# Patient Record
Sex: MALE | Race: WHITE
[De-identification: names, ages, dates, MRNs, and addresses within clinical notes are randomized per-mention and may not be internally consistent; named-entity substitution may affect disease eponyms.]

---

## 2021-03-01 ENCOUNTER — HOSPITAL ENCOUNTER (OUTPATIENT)
Dept: HOSPITAL 75 - RAD FS | Age: 76
End: 2021-03-01
Attending: FAMILY MEDICINE
Payer: MEDICARE

## 2021-03-01 DIAGNOSIS — I72.3: ICD-10-CM

## 2021-03-01 DIAGNOSIS — K80.20: ICD-10-CM

## 2021-03-01 DIAGNOSIS — I71.4: Primary | ICD-10-CM

## 2021-03-01 DIAGNOSIS — M89.8X8: ICD-10-CM

## 2021-03-01 PROCEDURE — 74177 CT ABD & PELVIS W/CONTRAST: CPT

## 2021-03-01 NOTE — DIAGNOSTIC IMAGING REPORT
PROCEDURE: CT abdomen and pelvis with contrast.



TECHNIQUE: Multiple contiguous axial images were obtained through

the abdomen and pelvis after administration of intravenous

contrast. Auto Exposure Controls were utilized during the CT exam

to meet ALARA standards for radiation dose reduction. All CT

scans use one or more of the following dose optimizing

techniques: automated exposure control, MA and/or KvP adjustment

based on patient size and exam type or iterative reconstruction.



INDICATION:  Abdominal aortic aneurysm.



COMPARISON: None available.



FINDINGS: 



LOWER THORAX:  Lung bases are clear. Visualized heart is normal

in size. Incidentally noted are small pericardiophrenic lymph

nodes, which are not enlarged by CT size criteria.



LIVER:  Normal. No focal lesion or acute pathology.



GALLBLADDER:  Hyperdense material layers posteriorly in the

distal body/fundus. No gallbladder wall thickening or

pericholecystic fluid.



BILE DUCTS:  No biliary ductal dilatation.



SPLEEN:  Normal.



PANCREAS:  Normal. No pancreatic ductal dilatation.



ADRENAL GLANDS:  No nodules.



KIDNEYS AND URETERS:  The kidneys are symmetric in size and

demonstrate normal enhancement. No hydronephrosis renal calculus

on either side. Subcentimeter foci of low-attenuation in both

kidneys are too small to characterize, but likely represent

cysts. No suspicious renal mass. No abnormality in the visualized

ureters.

 

STOMACH AND BOWEL:  Stomach and duodenum are normal. Small bowel

and colon are normal in course and caliber, without evidence of

wall thickening or obstruction. No unusual stool burden.



APPENDIX:  Not visualized. No pericecal inflammation.

 

PELVIC ORGANS/BLADDER:  Bladder is normal. Prostate gland is

normal in size.

 

PERITONEUM AND RETROPERITONEUM:  No pneumoperitoneum. No

abdominal free fluid or loculated collection.



LYMPH NODES:  No lymphadenopathy.



VESSELS: There is fusiform dilatation of the infrarenal abdominal

aorta at the level of the aortic bifurcation. This is difficult

to accurately measure given its close proximity to the origin of

the left common iliac artery, however, measures approximately 5.0

cm in greatest AP oblique diameter (image 129 series 5). There is

moderate noncalcified atherosclerotic plaque noted at the level

of the aortic bifurcation, with scattered calcified

atherosclerotic plaque also noted in the abdominal aorta and

iliac arteries. There is also aneurysmal dilatation of the common

iliac arteries. The left common iliac artery measures up to

approximately 1.9 cm in greatest AP diameter and the right common

iliac artery measures up to approximately 3.5 cm in greatest AP

diameter. The renal and mesenteric arteries appear patent on this

study performed without angiographic protocol. Incidental note is

made of a replaced right hepatic artery, which arises directly

from the aorta. There is no evidence of venous thrombosis.



ABDOMINAL WALL: There is a fat-containing paraumbilical hernia,

with the ventral wall defect just below the level of the

umbilicus measuring 2.4 cm in transverse diameter. There is no

inflammatory change in the herniated fat to suggest

strangulation.



BONES: Mild degenerative changes involve the spine. No acute

osseous abnormalities identified. There is a heterogeneous

sclerotic lesion in the right anterior iliac bone near the

acetabular roof (images 72-73 series 3), which measures up to 2

cm in greatest AP diameter. Smaller benign-appearing sclerotic

focus is demonstrated in the posterior right iliac bone, likely a

benign bone island (image 68 series 3).





IMPRESSION: 

No acute abdominal or pelvic pathology.



Infrarenal abdominal aortic aneurysm at the level of the aortic

bifurcation, which measures up to approximately 5 cm in oblique

AP diameter. 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

iliac arteries. There is no evidence of rupture, retroperitoneal

hemorrhage, or other acute abnormality related to these findings.



Cholelithiasis, without evidence of acute cholecystitis.



Incidental note is made of a heterogeneous sclerotic lesion in

the right iliac bone. This is indeterminate and may represent a

bone infarct. Comparison to more remote priors would be

beneficial to assess for stability of this finding. If the

patient has history of malignancy and there is concern for

metastatic disease, nuclear medicine bone scan may be of benefit

in further evaluation.



Other chronic and incidental findings are detailed above.







Dictated by: 



  Dictated on workstation # [id number]

## 2021-03-26 ENCOUNTER — HOSPITAL ENCOUNTER (OUTPATIENT)
Dept: HOSPITAL 75 - CARD | Age: 76
End: 2021-03-26
Attending: INTERNAL MEDICINE
Payer: MEDICARE

## 2021-03-26 DIAGNOSIS — I34.0: ICD-10-CM

## 2021-03-26 DIAGNOSIS — I11.9: Primary | ICD-10-CM

## 2021-03-26 PROCEDURE — 93306 TTE W/DOPPLER COMPLETE: CPT

## 2021-05-10 ENCOUNTER — HOSPITAL ENCOUNTER (OUTPATIENT)
Dept: HOSPITAL 75 - LABNPT | Age: 76
End: 2021-05-10
Attending: INTERNAL MEDICINE
Payer: MEDICARE

## 2021-05-10 DIAGNOSIS — Z01.818: Primary | ICD-10-CM

## 2021-05-10 DIAGNOSIS — Z20.822: ICD-10-CM

## 2021-05-10 PROCEDURE — 87635 SARS-COV-2 COVID-19 AMP PRB: CPT

## 2021-05-12 ENCOUNTER — HOSPITAL ENCOUNTER (OUTPATIENT)
Dept: HOSPITAL 75 - ICU | Age: 76
LOS: 1 days | Discharge: HOME | End: 2021-05-13
Attending: INTERNAL MEDICINE
Payer: MEDICARE

## 2021-05-12 VITALS — DIASTOLIC BLOOD PRESSURE: 87 MMHG | SYSTOLIC BLOOD PRESSURE: 143 MMHG

## 2021-05-12 VITALS — SYSTOLIC BLOOD PRESSURE: 130 MMHG | DIASTOLIC BLOOD PRESSURE: 74 MMHG

## 2021-05-12 VITALS — DIASTOLIC BLOOD PRESSURE: 107 MMHG | SYSTOLIC BLOOD PRESSURE: 133 MMHG

## 2021-05-12 VITALS — SYSTOLIC BLOOD PRESSURE: 114 MMHG | DIASTOLIC BLOOD PRESSURE: 81 MMHG

## 2021-05-12 VITALS — SYSTOLIC BLOOD PRESSURE: 128 MMHG | DIASTOLIC BLOOD PRESSURE: 86 MMHG

## 2021-05-12 VITALS — SYSTOLIC BLOOD PRESSURE: 126 MMHG | DIASTOLIC BLOOD PRESSURE: 78 MMHG

## 2021-05-12 VITALS — DIASTOLIC BLOOD PRESSURE: 59 MMHG | SYSTOLIC BLOOD PRESSURE: 111 MMHG

## 2021-05-12 VITALS — DIASTOLIC BLOOD PRESSURE: 83 MMHG | SYSTOLIC BLOOD PRESSURE: 121 MMHG

## 2021-05-12 VITALS — SYSTOLIC BLOOD PRESSURE: 115 MMHG | DIASTOLIC BLOOD PRESSURE: 72 MMHG

## 2021-05-12 VITALS — DIASTOLIC BLOOD PRESSURE: 72 MMHG | SYSTOLIC BLOOD PRESSURE: 120 MMHG

## 2021-05-12 VITALS — SYSTOLIC BLOOD PRESSURE: 122 MMHG | DIASTOLIC BLOOD PRESSURE: 94 MMHG

## 2021-05-12 VITALS — SYSTOLIC BLOOD PRESSURE: 128 MMHG | DIASTOLIC BLOOD PRESSURE: 70 MMHG

## 2021-05-12 VITALS — BODY MASS INDEX: 38.59 KG/M2 | HEIGHT: 74.02 IN | WEIGHT: 300.71 LBS

## 2021-05-12 VITALS — DIASTOLIC BLOOD PRESSURE: 78 MMHG | SYSTOLIC BLOOD PRESSURE: 122 MMHG

## 2021-05-12 VITALS — DIASTOLIC BLOOD PRESSURE: 82 MMHG | SYSTOLIC BLOOD PRESSURE: 127 MMHG

## 2021-05-12 VITALS — DIASTOLIC BLOOD PRESSURE: 96 MMHG | SYSTOLIC BLOOD PRESSURE: 102 MMHG

## 2021-05-12 VITALS — SYSTOLIC BLOOD PRESSURE: 120 MMHG | DIASTOLIC BLOOD PRESSURE: 84 MMHG

## 2021-05-12 DIAGNOSIS — Z86.73: ICD-10-CM

## 2021-05-12 DIAGNOSIS — G47.33: ICD-10-CM

## 2021-05-12 DIAGNOSIS — I71.4: ICD-10-CM

## 2021-05-12 DIAGNOSIS — Z87.891: ICD-10-CM

## 2021-05-12 DIAGNOSIS — E66.9: ICD-10-CM

## 2021-05-12 DIAGNOSIS — Z79.891: ICD-10-CM

## 2021-05-12 DIAGNOSIS — E78.2: ICD-10-CM

## 2021-05-12 DIAGNOSIS — I34.0: ICD-10-CM

## 2021-05-12 DIAGNOSIS — Z98.890: ICD-10-CM

## 2021-05-12 DIAGNOSIS — E11.9: ICD-10-CM

## 2021-05-12 DIAGNOSIS — I48.20: Primary | ICD-10-CM

## 2021-05-12 DIAGNOSIS — Z79.02: ICD-10-CM

## 2021-05-12 DIAGNOSIS — Z88.8: ICD-10-CM

## 2021-05-12 DIAGNOSIS — I11.9: ICD-10-CM

## 2021-05-12 DIAGNOSIS — I25.10: ICD-10-CM

## 2021-05-12 DIAGNOSIS — Z79.4: ICD-10-CM

## 2021-05-12 DIAGNOSIS — E78.1: ICD-10-CM

## 2021-05-12 DIAGNOSIS — I49.3: ICD-10-CM

## 2021-05-12 DIAGNOSIS — Z79.899: ICD-10-CM

## 2021-05-12 DIAGNOSIS — Z79.01: ICD-10-CM

## 2021-05-12 LAB
ALBUMIN SERPL-MCNC: 3.7 GM/DL (ref 3.2–4.5)
ALP SERPL-CCNC: 59 U/L (ref 40–136)
ALT SERPL-CCNC: 19 U/L (ref 0–55)
APTT BLD: 31 SEC (ref 24–35)
BILIRUB SERPL-MCNC: 0.4 MG/DL (ref 0.1–1)
BUN/CREAT SERPL: 20
CALCIUM SERPL-MCNC: 9 MG/DL (ref 8.5–10.1)
CHLORIDE SERPL-SCNC: 105 MMOL/L (ref 98–107)
CHOLEST SERPL-MCNC: 124 MG/DL (ref ?–200)
CO2 SERPL-SCNC: 30 MMOL/L (ref 21–32)
CREAT SERPL-MCNC: 1.14 MG/DL (ref 0.6–1.3)
GFR SERPLBLD BASED ON 1.73 SQ M-ARVRAT: > 60 ML/MIN
GLUCOSE SERPL-MCNC: 195 MG/DL (ref 70–105)
HCT VFR BLD CALC: 43 % (ref 40–54)
HDLC SERPL-MCNC: 26 MG/DL (ref 40–60)
HGB BLD-MCNC: 14 G/DL (ref 13.3–17.7)
INR PPP: 1.1 (ref 0.8–1.4)
MCH RBC QN AUTO: 29 PG (ref 25–34)
MCHC RBC AUTO-ENTMCNC: 33 G/DL (ref 32–36)
MCV RBC AUTO: 88 FL (ref 80–99)
PLATELET # BLD: 209 10^3/UL (ref 130–400)
PMV BLD AUTO: 11.3 FL (ref 9–12.2)
POTASSIUM SERPL-SCNC: 3.8 MMOL/L (ref 3.6–5)
PROT SERPL-MCNC: 6.7 GM/DL (ref 6.4–8.2)
PROTHROMBIN TIME: 14.2 SEC (ref 12.2–14.7)
SODIUM SERPL-SCNC: 142 MMOL/L (ref 135–145)
TRIGL SERPL-MCNC: 137 MG/DL (ref ?–150)
VLDLC SERPL CALC-MCNC: 27 MG/DL (ref 5–40)
WBC # BLD AUTO: 7.3 10^3/UL (ref 4.3–11)

## 2021-05-12 PROCEDURE — 80053 COMPREHEN METABOLIC PANEL: CPT

## 2021-05-12 PROCEDURE — 80061 LIPID PANEL: CPT

## 2021-05-12 PROCEDURE — 93312 ECHO TRANSESOPHAGEAL: CPT

## 2021-05-12 PROCEDURE — 85610 PROTHROMBIN TIME: CPT

## 2021-05-12 PROCEDURE — 92960 CARDIOVERSION ELECTRIC EXT: CPT

## 2021-05-12 PROCEDURE — 36415 COLL VENOUS BLD VENIPUNCTURE: CPT

## 2021-05-12 PROCEDURE — 85027 COMPLETE CBC AUTOMATED: CPT

## 2021-05-12 PROCEDURE — 71045 X-RAY EXAM CHEST 1 VIEW: CPT

## 2021-05-12 PROCEDURE — 93005 ELECTROCARDIOGRAM TRACING: CPT

## 2021-05-12 PROCEDURE — 85730 THROMBOPLASTIN TIME PARTIAL: CPT

## 2021-05-12 PROCEDURE — 87081 CULTURE SCREEN ONLY: CPT

## 2021-05-12 RX ADMIN — FUROSEMIDE SCH MG: 40 TABLET ORAL at 18:56

## 2021-05-12 NOTE — CARDIOVERSION
Cardioversion


PROCEDURE PHYSICIAN:   Hailey Stearns 


 


DATE OF PROCEDURE:  5/12/21 


 


DIRECT EXTERNAL ELECTRICAL CARDIOVERSION:  


 


Indications:


Atrial Fibrillation with rapid ventricular rate





Preoperative diagnoses: 


Atrial Fibrillation with rapid ventricular rate


 


Postoperative diagnosis: 


Atrial fibrillation





Anesthesia:


By Anesthesia services





Complications: 


None





Specimen: 


None





Contrast: 0





Flouroscopy: none





Procedure Details:


 


The patient was brought the cath lab after informed consent was taken, all the 

risks and complications were explained including the risk of stroke. Electrical 

cardioversion was carried out with anesthesia support with propofol. 200 joules 

of synchronized shock was  delivered twice without success in terminating atrial

fibrillation.  Decision was made to load the patient with amiodarone and attempt

cardioversion again tomorrow





Conclusions:


Failed to attempt for cardioversion with 200 J














HAILEY STEARNS MD             May 12, 2021 12:17 pm

## 2021-05-12 NOTE — DIAGNOSTIC IMAGING REPORT
INDICATION: Atrial fibrillation and pre-heart catheterization.



Time of exam: 9:47 AM



No prior studies are available for comparison.



The heart size is normal. The pulmonary vascularity is

unremarkable. The lungs are clear. No infiltrate, effusion or

pneumothorax is detected.



IMPRESSION: No acute cardiopulmonary process is detected.



Dictated by: 



  Dictated on workstation # [id number]

## 2021-05-12 NOTE — CONSCIOUS SEDATION/ASA
Conscious Sedation Pre-Proced


Time


11:00





ASA Score


3


For ASA 3 and 4: Consider anesthesia and medical clearance. Also, for patients

with a history of failed moderate sedation consider anesthesia.

















Airway 


 


Lungs 


 


Heart 


 


 ASA score


 


 ASA 1: a normal healthy patient


 


 ASA 2:  a patient with a mild systemic disease (mid diabetes, controlled 

hypertension, obesity 


 


x ASA 3:  a patient with a severe systemic disease that limits activity  

(angina, COPD, prior Myocardial infarction)


 


 ASA 4:  a patient with an incapacitating disease that is a constant threat to 

life (CHF, renal failure)


 


 ASA 5:  a moribund patient not expected to survive 24 hrs.  (ruptured aneurysm)


 


 ASA 6:  a declared brain-dead patient whose organs are being harvested.


 


 For emergent operations, add the letter E after the classification











Mallampati Classification


Grade 3





Sedation Plan


Analgesia, Amnesia, Plan communicated to team members, Discussed options with 

patient/fam, Discussed risks with patient/fam


The patient is an appropriate candidate to undergo the planned procedure, 

sedation, and anesthesia.





The patient immediately re-assessed prior to indication.











HAILEY ZEE MD             May 12, 2021 12:16 pm

## 2021-05-12 NOTE — ANESTHESIA-GENERAL POST-OP
MAC


Patient Condition


Mental Status/LOC:  Same as Preop


Cardiovascular:  Satisfactory


Nausea/Vomiting:  Absent


Respiratory:  Satisfactory


Pain:  Controlled


Complications:  Absent





Post Op Complications


Complications


None





Follow Up Care/Instructions


Patient Instructions


None needed.





Anesthesiology Discharge Order


Discharge Order


Patient is doing well, no complaints, stable vital signs, no apparent adverse 

anesthesia problems.   


No complications reported per nursing.











MICAH EVERETT CRNA              May 12, 2021 12:55

## 2021-05-13 VITALS — DIASTOLIC BLOOD PRESSURE: 69 MMHG | SYSTOLIC BLOOD PRESSURE: 109 MMHG

## 2021-05-13 VITALS — SYSTOLIC BLOOD PRESSURE: 123 MMHG | DIASTOLIC BLOOD PRESSURE: 89 MMHG

## 2021-05-13 VITALS — SYSTOLIC BLOOD PRESSURE: 104 MMHG | DIASTOLIC BLOOD PRESSURE: 69 MMHG

## 2021-05-13 VITALS — SYSTOLIC BLOOD PRESSURE: 98 MMHG | DIASTOLIC BLOOD PRESSURE: 46 MMHG

## 2021-05-13 VITALS — SYSTOLIC BLOOD PRESSURE: 114 MMHG | DIASTOLIC BLOOD PRESSURE: 66 MMHG

## 2021-05-13 VITALS — DIASTOLIC BLOOD PRESSURE: 52 MMHG | SYSTOLIC BLOOD PRESSURE: 109 MMHG

## 2021-05-13 VITALS — SYSTOLIC BLOOD PRESSURE: 108 MMHG | DIASTOLIC BLOOD PRESSURE: 64 MMHG

## 2021-05-13 RX ADMIN — FUROSEMIDE SCH MG: 40 TABLET ORAL at 07:50

## 2021-05-13 NOTE — CARDIOLOGY PROGRESS NOTE
Subjective


Date Seen by Provider:  May 13, 2021


Time Seen by Provider:  07:50


Subjective/Events-last exam


Patient is laying down in bed, feeling better, no further chest pain


Has chest pain, shortness of breath and diaphoresis when he was started on 

amiodarone drip subsequently it was discontinued


Review of Systems


General:  No Chills, No Night Sweats, No Fatigue, No Malaise, No Appetite, No 

Other


HEENT:  No Head Aches, No Visual Changes, No Eye Pain, No Ear Pain, No 

Dysphasia, No Sinus Congestion, No Post Nasal Drip, No Sore Throat, No Other


Pulmonary:  No Dyspnea, No Cough, No Pleuritic Chest Pain, No Other


Cardiovascular:  No: Chest Pain, Palpitations, Orthopnea, Paroxysmal Noc. 

Dyspnea, Edema, Lt Headedness, Other





Objective-Cardiology


Exam


Last Set of Vital Signs





Vital Signs








 5/12/21 5/12/21 5/13/21 5/13/21





 09:54 11:59 06:00 06:36


 


Temp 36.4   


 


Pulse   77 


 


Resp   24 


 


B/P (MAP)   123/89 (100) 


 


Pulse Ox    95


 


O2 Delivery    Room Air


 


O2 Flow Rate  8.00  





Capillary Refill :


General:  Alert, Oriented X3, Cooperative


HEENT:  Atraumatic, PERRLA


Neck:  Supple, No JVD, No Thyromegaly


Lungs:  Clear to Auscultation, Normal Air Movement


Heart:  Normal S1, Normal S2, No Murmurs, Other (Atrial fibrillation)


Abdomen:  Normal Bowel Sounds, Soft, No Tenderness, No Hepatosplenomegaly, No 

Masses


Extremities:  No Clubbing, No Cyanosis, No Edema, Normal Pulses, No 

Tenderness/Swelling


Skin:  No Rashes, No Breakdown, No Significant Lesion


Neuro:  Normal Gait, Normal Speech, Strength at 5/5 X4 Ext, Normal Tone, 

Sensation Intact


Psych/Mental Status:  Mental Status NL, Mood NL





Results


Lab


Laboratory Tests


5/12/21 09:45














A/P-Cardiology


Admission Diagnosis


Persistent atrial fibrillation


Palpitation


Hypertension


Hyperlipidemia


Coronary artery disease





Assessment/Plan


Atrial fibrillation with controlled rate, underwent YAQUELIN with attempt of 

cardioversion twice yesterday has failed, he was started on amiodarone drip and 

had a reaction, had chest pain and shortness of breath and diaphoresis 

subsequently it was discontinued, this morning he is feeling well.  No other 

symptoms.  I was planning to do cardioversion this morning but elected to cancel

the procedure and refer him for EP evaluation as an outpatient





Palpitation, better at this time





Coronary artery disease, clinically stable, monitor as an outpatient





Hypertension, monitor blood pressure restart home medication





Hyperlipidemia.











HAILEY ZEE MD              May 13, 2021 07:52

## 2021-05-13 NOTE — DISCHARGE INST-POST CATH
Discharge Inst-CATH/EP


Problems Reviewed?:  Yes


Post Cardiac Cath/EP D/C Inst


Follow Up/Plan


Appointment with Dr. Stearns's office in 2 weeks


<b>CARDIAC CATH/EP PROCEDURE DISCHARGE INSTRUCTIONS</b>





ACTIVITY





* Go Home directly and rest.


* Limit activity of the leg (or wrist if it was used) for 7 days including 

aerobics, swimming,


   jogging, bicycling, etc.


* Restrict stair-climbing for 7 days if possible, if not, climb up with your 

non-cath leg, then


   bring together on the same step.


* Avoid lifting, pushing, pulling or excessive movement of the affected 

extremity for 7 days.


* Customary sexual activity may be resumed after 2 days-use caution not to use a

position  


   that strains or causes pain to the affected extremity.


* No driving for 24 hours.


* NO SMOKING. 


* Avoid straining for bowel movements for 7 days.


* Gentle walking on level ground is allowed.


* Returning to work will depend on the type of procedure and the results. Your 

doctor will discuss


   this with you.





CALL YOUR DOCTOR FOR ANY OF THE FOLLOWING:





*If bleeding from the puncture site occurs- Apply gentle pressure to site with 

clean cloth and call


   your doctor or EMS.


* If a knot or lump forms under the skin, increases in size, or causes pain.


* If bruising appears to be worsening or moving further down your leg instead of

disappearing.


* Temperature above 101 F.





CARE OF YOUR GROIN INCISION;





* Bruising or purple discoloration of the skin near the puncture site is common.


* You may shower only, no bathtub bathing for 5 days.  Be careful to avoid 

slipping as your


   leg may feel stiff.


* If a closure device was used on your femoral artery, please see the attached 

guide regarding


   care of the device and your leg.


* Leave dressing on FOR 24 hours.





CARE OF YOUR WRIST INCISION;





* Bruising or purple discoloration of the skin near the puncture site is common.


* You may shower.


* DO NOT submerge wrist.


* Leave dressing on FOR 24 hours.











HAILEY STEARNS MD              May 13, 2021 07:50

## 2021-09-30 ENCOUNTER — HOSPITAL ENCOUNTER (INPATIENT)
Dept: HOSPITAL 75 - ER FS | Age: 76
LOS: 5 days | Discharge: HOME | DRG: 640 | End: 2021-10-05
Attending: FAMILY MEDICINE | Admitting: FAMILY MEDICINE
Payer: MEDICARE

## 2021-09-30 VITALS — DIASTOLIC BLOOD PRESSURE: 77 MMHG | SYSTOLIC BLOOD PRESSURE: 155 MMHG

## 2021-09-30 VITALS — SYSTOLIC BLOOD PRESSURE: 123 MMHG | DIASTOLIC BLOOD PRESSURE: 56 MMHG

## 2021-09-30 VITALS — HEIGHT: 74.02 IN | BODY MASS INDEX: 39.13 KG/M2 | WEIGHT: 304.9 LBS

## 2021-09-30 DIAGNOSIS — Z86.73: ICD-10-CM

## 2021-09-30 DIAGNOSIS — I48.0: ICD-10-CM

## 2021-09-30 DIAGNOSIS — Z88.8: ICD-10-CM

## 2021-09-30 DIAGNOSIS — I27.81: ICD-10-CM

## 2021-09-30 DIAGNOSIS — E61.1: ICD-10-CM

## 2021-09-30 DIAGNOSIS — E78.00: ICD-10-CM

## 2021-09-30 DIAGNOSIS — Z99.81: ICD-10-CM

## 2021-09-30 DIAGNOSIS — J43.9: ICD-10-CM

## 2021-09-30 DIAGNOSIS — E87.70: Primary | ICD-10-CM

## 2021-09-30 DIAGNOSIS — T82.310A: ICD-10-CM

## 2021-09-30 DIAGNOSIS — E66.2: ICD-10-CM

## 2021-09-30 DIAGNOSIS — T50.2X5A: ICD-10-CM

## 2021-09-30 DIAGNOSIS — R14.0: ICD-10-CM

## 2021-09-30 DIAGNOSIS — I25.10: ICD-10-CM

## 2021-09-30 DIAGNOSIS — Z95.5: ICD-10-CM

## 2021-09-30 DIAGNOSIS — Z85.828: ICD-10-CM

## 2021-09-30 DIAGNOSIS — K59.00: ICD-10-CM

## 2021-09-30 DIAGNOSIS — I34.0: ICD-10-CM

## 2021-09-30 DIAGNOSIS — E78.5: ICD-10-CM

## 2021-09-30 DIAGNOSIS — E78.1: ICD-10-CM

## 2021-09-30 DIAGNOSIS — E53.8: ICD-10-CM

## 2021-09-30 DIAGNOSIS — H91.90: ICD-10-CM

## 2021-09-30 DIAGNOSIS — H54.7: ICD-10-CM

## 2021-09-30 DIAGNOSIS — Z23: ICD-10-CM

## 2021-09-30 DIAGNOSIS — I11.0: ICD-10-CM

## 2021-09-30 DIAGNOSIS — Z79.01: ICD-10-CM

## 2021-09-30 DIAGNOSIS — K21.9: ICD-10-CM

## 2021-09-30 DIAGNOSIS — I50.9: ICD-10-CM

## 2021-09-30 DIAGNOSIS — Z79.899: ICD-10-CM

## 2021-09-30 DIAGNOSIS — Z79.02: ICD-10-CM

## 2021-09-30 DIAGNOSIS — J96.90: ICD-10-CM

## 2021-09-30 DIAGNOSIS — E11.9: ICD-10-CM

## 2021-09-30 DIAGNOSIS — Z79.4: ICD-10-CM

## 2021-09-30 DIAGNOSIS — E87.6: ICD-10-CM

## 2021-09-30 DIAGNOSIS — J20.9: ICD-10-CM

## 2021-09-30 DIAGNOSIS — Z87.891: ICD-10-CM

## 2021-09-30 DIAGNOSIS — Z79.82: ICD-10-CM

## 2021-09-30 LAB
ALBUMIN SERPL-MCNC: 4.2 GM/DL (ref 3.2–4.5)
ALP SERPL-CCNC: 82 U/L (ref 40–136)
ALT SERPL-CCNC: 20 U/L (ref 0–55)
APTT BLD: 28 SEC (ref 24–35)
BASOPHILS # BLD AUTO: 0.1 10^3/UL (ref 0–0.1)
BASOPHILS NFR BLD AUTO: 1 % (ref 0–10)
BILIRUB SERPL-MCNC: 0.3 MG/DL (ref 0.1–1)
BUN/CREAT SERPL: 15
CALCIUM SERPL-MCNC: 9.2 MG/DL (ref 8.5–10.1)
CHLORIDE SERPL-SCNC: 99 MMOL/L (ref 98–107)
CO2 SERPL-SCNC: 25 MMOL/L (ref 21–32)
CREAT SERPL-MCNC: 1.2 MG/DL (ref 0.6–1.3)
EOSINOPHIL # BLD AUTO: 0.3 10^3/UL (ref 0–0.3)
EOSINOPHIL NFR BLD AUTO: 3 % (ref 0–10)
GFR SERPLBLD BASED ON 1.73 SQ M-ARVRAT: 59 ML/MIN
GLUCOSE SERPL-MCNC: 364 MG/DL (ref 70–105)
HCT VFR BLD CALC: 36 % (ref 40–54)
HGB BLD-MCNC: 11.1 G/DL (ref 13.3–17.7)
INR PPP: 1 (ref 0.8–1.4)
LIPASE SERPL-CCNC: 35 U/L (ref 8–78)
LYMPHOCYTES # BLD AUTO: 2.1 X 10^3 (ref 1–4)
LYMPHOCYTES NFR BLD AUTO: 26 % (ref 12–44)
MANUAL DIFFERENTIAL PERFORMED BLD QL: NO
MCH RBC QN AUTO: 23 PG (ref 25–34)
MCHC RBC AUTO-ENTMCNC: 31 G/DL (ref 32–36)
MCV RBC AUTO: 76 FL (ref 80–99)
MONOCYTES # BLD AUTO: 0.7 X 10^3 (ref 0–1)
MONOCYTES NFR BLD AUTO: 9 % (ref 0–12)
NEUTROPHILS # BLD AUTO: 4.6 X 10^3 (ref 1.8–7.8)
NEUTROPHILS NFR BLD AUTO: 59 % (ref 42–75)
PLATELET # BLD: 324 10^3/UL (ref 130–400)
PMV BLD AUTO: 11.2 FL (ref 9–12.2)
POTASSIUM SERPL-SCNC: 4 MMOL/L (ref 3.6–5)
PROT SERPL-MCNC: 7 GM/DL (ref 6.4–8.2)
PROTHROMBIN TIME: 13.6 SEC (ref 12.2–14.7)
SODIUM SERPL-SCNC: 139 MMOL/L (ref 135–145)
WBC # BLD AUTO: 7.8 10^3/UL (ref 4.3–11)

## 2021-09-30 PROCEDURE — 87070 CULTURE OTHR SPECIMN AEROBIC: CPT

## 2021-09-30 PROCEDURE — 82607 VITAMIN B-12: CPT

## 2021-09-30 PROCEDURE — 80076 HEPATIC FUNCTION PANEL: CPT

## 2021-09-30 PROCEDURE — 36415 COLL VENOUS BLD VENIPUNCTURE: CPT

## 2021-09-30 PROCEDURE — 83540 ASSAY OF IRON: CPT

## 2021-09-30 PROCEDURE — 80053 COMPREHEN METABOLIC PANEL: CPT

## 2021-09-30 PROCEDURE — 84145 PROCALCITONIN (PCT): CPT

## 2021-09-30 PROCEDURE — 83735 ASSAY OF MAGNESIUM: CPT

## 2021-09-30 PROCEDURE — 84484 ASSAY OF TROPONIN QUANT: CPT

## 2021-09-30 PROCEDURE — 71046 X-RAY EXAM CHEST 2 VIEWS: CPT

## 2021-09-30 PROCEDURE — 93306 TTE W/DOPPLER COMPLETE: CPT

## 2021-09-30 PROCEDURE — 71045 X-RAY EXAM CHEST 1 VIEW: CPT

## 2021-09-30 PROCEDURE — 85610 PROTHROMBIN TIME: CPT

## 2021-09-30 PROCEDURE — 83690 ASSAY OF LIPASE: CPT

## 2021-09-30 PROCEDURE — 82274 ASSAY TEST FOR BLOOD FECAL: CPT

## 2021-09-30 PROCEDURE — 87205 SMEAR GRAM STAIN: CPT

## 2021-09-30 PROCEDURE — 93005 ELECTROCARDIOGRAM TRACING: CPT

## 2021-09-30 PROCEDURE — 85730 THROMBOPLASTIN TIME PARTIAL: CPT

## 2021-09-30 PROCEDURE — 94760 N-INVAS EAR/PLS OXIMETRY 1: CPT

## 2021-09-30 PROCEDURE — 83550 IRON BINDING TEST: CPT

## 2021-09-30 PROCEDURE — 82728 ASSAY OF FERRITIN: CPT

## 2021-09-30 PROCEDURE — 82805 BLOOD GASES W/O2 SATURATION: CPT

## 2021-09-30 PROCEDURE — 94640 AIRWAY INHALATION TREATMENT: CPT

## 2021-09-30 PROCEDURE — 80048 BASIC METABOLIC PNL TOTAL CA: CPT

## 2021-09-30 PROCEDURE — 94761 N-INVAS EAR/PLS OXIMETRY MLT: CPT

## 2021-09-30 PROCEDURE — 85025 COMPLETE CBC W/AUTO DIFF WBC: CPT

## 2021-09-30 PROCEDURE — 83880 ASSAY OF NATRIURETIC PEPTIDE: CPT

## 2021-09-30 PROCEDURE — 74177 CT ABD & PELVIS W/CONTRAST: CPT

## 2021-09-30 PROCEDURE — 82947 ASSAY GLUCOSE BLOOD QUANT: CPT

## 2021-09-30 RX ADMIN — PREGABALIN SCH MG: 100 CAPSULE ORAL at 20:01

## 2021-09-30 RX ADMIN — Medication SCH ML: at 20:01

## 2021-09-30 RX ADMIN — INSULIN ASPART SCH UNIT: 100 INJECTION, SOLUTION INTRAVENOUS; SUBCUTANEOUS at 21:37

## 2021-09-30 RX ADMIN — APIXABAN SCH MG: 5 TABLET, FILM COATED ORAL at 20:01

## 2021-09-30 RX ADMIN — CYCLOBENZAPRINE HYDROCHLORIDE SCH MG: 10 TABLET, FILM COATED ORAL at 20:01

## 2021-09-30 RX ADMIN — FUROSEMIDE SCH MG: 10 INJECTION, SOLUTION INTRAVENOUS at 18:36

## 2021-09-30 NOTE — ED ABDOMINAL PAIN
General


Stated Complaint:  BOWEL CONSTIPATION


Source of Information:  Patient


Exam Limitations:  No Limitations





History of Present Illness


Date Seen by Provider:  Sep 30, 2021


Time Seen by Provider:  12:48


Initial Comments


76yoM with PMH of CAD, pAFIB on anticoagulation, HTN, HLD, DM, COPD, and AAA s/p

repair coming in due to feeling bloated. Had not had a BM in about 4 days so was

taking a large amount of stool softeners. Did had a watery stool yesterday but 

did not feel better after. Took himself off lasix 1 week ago because of cramping

in his legs, and started drinking a lot of pedialyte, carbonated beverages, and 

miralax. Started having increased swelling in his extremities shortly after. 

Took a dose of lasix this morning and has urinated a lot thus far. Now is 

feeling more SOB on top of his normal amount with his lung disease. Normally can

sleep with one pillow and now can't lay flat. Was 379 pounds 4 days ago and is 

407 today.





Allergies and Home Medications


Allergies


Coded Allergies:  


     lisinopril (Verified  Allergy, Mild, 21)


   MUSCLE ACHES


     losartan (Verified  Allergy, Mild, Rash, 21)


     lovastatin (Verified  Allergy, Mild, 21)


   MUSCLE ACHES


     rosiglitazone (Verified  Allergy, Mild, Hives, 21)


     umeclidinium (Verified  Allergy, Mild, Rash, 21)





Patient Home Medication List


Home Medication List Reviewed:  Yes


Albuterol Sulfate (Proair Hfa) 1 Puff Puff, 2 PUFF IH Q4H PRN for SHORTNESS OF 

BREATH, (Reported)


   Entered as Reported by: TEDDY ROMAN on 21 1043


Apixaban (Eliquis) 5 Mg Tablet, 5 MG PO BID, (Reported)


   Entered as Reported by: TEDDY ROMAN on 21 1043


Clopidogrel Bisulfate (Clopidogrel) 75 Mg Tablet, 75 MG PO DAILY, (Reported)


   Entered as Reported by: TEDDY ROMAN on 21 1043


Diltiazem HCl (Cardizem Cd) 120 Mg Cap.er.24h, 120 MG PO DAILY, (Reported)


   Entered as Reported by: TEDDY ROMAN on 21 1043


Fenofibrate Nanocrystallized (Fenofibrate) 145 Mg Tablet, 145 MG PO DAILY, 

(Reported)


   Entered as Reported by: TEDDY ROMAN on 21


Fluticasone Propionate (Flonase Allergy Relief) 9.9 Ml [place].susp, 2 SPRAY NS 

DAILY PRN for ALLERGIES, (Reported)


   Entered as Reported by: TEDDY ROMAN on 21


Furosemide (Furosemide) 40 Mg Tablet, 40 MG PO BID, (Reported)


   Entered as Reported by: TEDDY ROMAN on 21


Hydrochlorothiazide (Hydrochlorothiazide) 12.5 Mg Tablet, 12.5 MG PO DAILY, 

(Reported)


   Entered as Reported by: TEDDY ROMAN on 21


Insulin Aspart (Novolog Flexpen) 300 Units/3 Ml Solution, 25 UNITS SQ TIDWM, 

(Reported)


   Entered as Reported by: TEDDY ROMAN on 21


Insulin Glargine,Hum.rec.anlog (Basaglar Kwikpen U-100) 100 Unit/1 Ml 

Insuln.pen, 60 UNIT SQ BID, (Reported)


   Entered as Reported by: TEDDY ROMAN on 21


Ipratropium/Albuterol Sulfate (Iprat-Albut 0.5-3(2.5) mg/3 ml) 3 Ml Ampul.neb, 3

ML IH Q6H PRN for SHORTNESS OF BREATH, (Reported)


   Entered as Reported by: TEDDY ROMAN on 21


Meloxicam (Meloxicam) 15 Mg Tablet, 15 MG PO DAILY, (Reported)


   Entered as Reported by: TEDDY ROMAN on 21


Metoprolol Succinate (Metoprolol Succinate) 50 Mg Tab.er.24h, 50 MG PO DAILY, 

(Reported)


   Entered as Reported by: TEDDY ROMAN on 21


Montelukast Sodium (Montelukast Sodium) 10 Mg Tablet, 10 MG PO HS, (Reported)


   Entered as Reported by: TEDDY ROMAN on 21


Omeprazole (Omeprazole) 20 Mg Capsule.dr, 20 MG PO DAILY, (Reported)


   Entered as Reported by: TEDDY ROMAN on 5/12/21 1043


Pregabalin (Pregabalin) 100 Mg Capsule, 100 MG PO TID, (Reported)


   Entered as Reported by: TEDDY ROMAN on 21 1043


Rosuvastatin Calcium (Rosuvastatin Calcium) 40 Mg Tablet, 40 MG PO DAILY, 

(Reported)


   Entered as Reported by: TEDDY ROMAN on 21 1043





Review of Systems


Review of Systems


Constitutional:  No chills, No fever


EENTM:  No Blurred Vision


Respiratory:  Denies Cough; Shortness of Air


Cardiovascular:  Denies Chest Pain, Denies Palpitations


Gastrointestinal:  Constipated; Denies Diarrhea, Denies Nausea, Denies Vomiting


Genitourinary:  No Symptoms Reported


Musculoskeletal:  No back pain


Skin:  No rash


Psychiatric/Neurological:  No Symptoms Reported


Endocrine:  No Symptoms Reported


Hematologic/Lymphatic:  No Symptoms Reported





All Other Systems Reviewed


Negative Unless Noted:  Yes





Past Medical-Social-Family Hx


Past Medical History


Appendectomy


Respiratory:  Yes


Emphysema


Cardiac:  Yes


Atrial Fibrillation, Coronary Artery Disease, High Cholesterol, Hypertension


Neurological:  Yes


TIA


Genitourinary:  No


Gastrointestinal:  Yes


Gastroesophageal Reflux


Cancer:  Yes


Skin


Did You Recieve Any Treatments:  Yes


What Type of Treatment Did You:  Surgical Intervention


Blood Disorders:  No





Physical Exam


Vital Signs





Vital Signs - First Documented








 21





 12:48


 


Temp 36.0


 


Pulse 82


 


Resp 26


 


B/P (MAP) 157/80 (105)


 


Pulse Ox 96


 


O2 Delivery Room Air





Capillary Refill :


Height/Weight/BMI


Height: '"


Weight: lbs. oz. kg; 38.59 BMI


Method:


General Appearance:  WD/WN, no apparent distress


HEENT:  PERRL/EOMI, normal ENT inspection, pharynx normal


Neck:  non-tender, full range of motion, supple, normal inspection


Respiratory:  chest non-tender, lungs clear, normal breath sounds, no 

respiratory distress, no accessory muscle use


Cardiovascular:  regular rate, rhythm, no murmur, other (3+ edema)


Gastrointestinal:  normal bowel sounds, soft; No distended, No guarding, No 

rebound; tenderness


Extremities:  normal range of motion, non-tender, normal inspection, no pedal 

edema, no calf tenderness, normal capillary refill


Back:  normal inspection, no CVA tenderness


Neurologic/Psychiatric:  no motor/sensory deficits, alert, normal mood/affect


Skin:  normal color, warm/dry


Lymphatic:  no adenopathy





Progress/Results/Core Measures


Results/Orders


Lab Results





Laboratory Tests








Test


 21


13:30 Range/Units


 


 


White Blood Count


 7.8 


 4.3-11.0


10^3/uL


 


Red Blood Count


 4.81 


 4.30-5.52


10^6/uL


 


Hemoglobin 11.1 L 13.3-17.7  g/dL


 


Hematocrit 36 L 40-54  %


 


Mean Corpuscular Volume 76 L 80-99  fL


 


Mean Corpuscular Hemoglobin 23 L 25-34  pg


 


Mean Corpuscular Hemoglobin


Concent 31 L


 32-36  g/dL





 


Red Cell Distribution Width 16.3 H 10.0-14.5  %


 


Platelet Count


 324 


 130-400


10^3/uL


 


Mean Platelet Volume 11.2  9.0-12.2  fL


 


Immature Granulocyte % (Auto) 1   %


 


Neutrophils (%) (Auto) 59  42-75  %


 


Lymphocytes (%) (Auto) 26  12-44  %


 


Monocytes (%) (Auto) 9  0-12  %


 


Eosinophils (%) (Auto) 3  0-10  %


 


Basophils (%) (Auto) 1  0-10  %


 


Neutrophils # (Auto) 4.6  1.8-7.8  X 10^3


 


Lymphocytes # (Auto) 2.1  1.0-4.0  X 10^3


 


Monocytes # (Auto) 0.7  0.0-1.0  X 10^3


 


Eosinophils # (Auto)


 0.3 


 0.0-0.3


10^3/uL


 


Basophils # (Auto)


 0.1 


 0.0-0.1


10^3/uL


 


Immature Granulocyte # (Auto)


 0.1 


 0.0-0.1


10^3/uL


 


Prothrombin Time 13.6  12.2-14.7  SEC


 


INR Comment 1.0  0.8-1.4  


 


Activated Partial


Thromboplast Time 28 


 24-35  SEC





 


Sodium Level 139  135-145  MMOL/L


 


Potassium Level 4.0  3.6-5.0  MMOL/L


 


Chloride Level 99    MMOL/L


 


Carbon Dioxide Level 25  21-32  MMOL/L


 


Anion Gap 15 H 5-14  MMOL/L


 


Blood Urea Nitrogen 18  7-18  MG/DL


 


Creatinine


 1.20 


 0.60-1.30


MG/DL


 


Estimat Glomerular Filtration


Rate 59 


  





 


BUN/Creatinine Ratio 15   


 


Glucose Level 364 H   MG/DL


 


Calcium Level 9.2  8.5-10.1  MG/DL


 


Corrected Calcium 9.0  8.5-10.1  MG/DL


 


Total Bilirubin 0.3  0.1-1.0  MG/DL


 


Aspartate Amino Transf


(AST/SGOT) 27 


 5-34  U/L





 


Alanine Aminotransferase


(ALT/SGPT) 20 


 0-55  U/L





 


Alkaline Phosphatase 82    U/L


 


Troponin I < 0.30  <0.30  NG/ML


 


Pro-B-Type Natriuretic Peptide 44.9  <75.0  PG/ML


 


Total Protein 7.0  6.4-8.2  GM/DL


 


Albumin 4.2  3.2-4.5  GM/DL


 


Lipase 35  8-78  U/L








My Orders





Orders - LUCRECIA GALICIA MD


Ct Abdomen/Pelvis W (21 13:06)


Cbc With Automated Diff (21 13:06)


Comprehensive Metabolic Panel (21 13:06)


Lipase (21 13:06)


Protime With Inr (21 13:06)


Partial Thromboplastin Time (21 13:06)


Probnp Fs (21 13:06)


Troponin I Fs (21 13:06)


Ekg Tracing (21 13:06)


Chest Pa/Lat (2 View) (21 13:06)


Iohexol Injection (Omnipaque 350 Mg/Ml 1 (21 15:00)


Received Contrast (Hold Metformin- Contr (21 15:00)


Sodium Chloride Flush (Catheter Flush Sy (21 15:00)


Ns (Ivpb) (Sodium Chloride 0.9% Ivpb Bag (21 15:00)


Furosemide  Injection (Lasix  Injection) (21 15:15)


Potassium Chloride (Tablet) (K Dur Table (21 15:45)





Medications Given in ED





Current Medications








 Medications  Dose


 Ordered  Sig/Jayda


 Route  Start Time


 Stop Time Status Last Admin


Dose Admin


 


 Furosemide  40 mg  ONCE  ONCE


 IVP  21 15:15


 21 15:16 DC 21 15:14


40 MG


 


 Iohexol  100 ml  ONCE  ONCE


 IV  21 15:00


 21 15:01 DC 21 14:59


100 ML


 


 Potassium Chloride  40 meq  ONCE  ONCE


 PO  21 15:45


 21 15:46 DC 21 16:05


40 MEQ


 


 Sodium Chloride  10 ml  AS NEEDED  PRN


 IV  21 15:00


    21 15:00


10 ML


 


 Sodium Chloride  100 ml  ONCE  ONCE


 IV  21 15:00


 21 15:01 DC 21 15:00


80 ML








Vital Signs/I&O











 21





 12:48


 


Temp 36.0


 


Pulse 82


 


Resp 26


 


B/P (MAP) 157/80 (105)


 


Pulse Ox 96


 


O2 Delivery Room Air











Progress


Progress Note :  


Progress Note


76yoM with above history coming in mainly for feeling bloated. ABCs intact and 

VSS on presentation. On physical exam he looks floridly volume overloaded with 

edema all the way of his abdomen. Additionally he has gained nearly 30 pounds in

roughly 4 days. He did just take Lasix oral prior to arrival which she has not 

had in over a week, and he has urinated 7 times within the past several hours. 

Differential for him includes volume overload from CHF versus kidney dysfunction

versus less likely small bowel obstruction versus ileus versus some other 

etiology. Basic labs including cardiac biomarkers and LFTs ordered. CT ordered 

to further assess and rule out bowel obstruction.





Labs significant for potassium of 4, proBNP of just under 50, creatinine 1.2, 

negative troponin.  CT abdomen and pelvis without any acute abnormalities 

including no bowel obstruction.  He does appear to have an endoleak that is 

poorly specified on the CT.  I discussed this with the patient, and he says he 

has not followed up with his surgeon and forgot.  I told him he absolutely needs

to follow-up in the very near future.  He was agreeable to this.


In regards to the patient being minimum 30 pounds volume overloaded, he did 

receive IV Lasix with good response.  He also received p.o. potassium.  I called

and discussed the case with Dr. Pond who will admit the patient to her service 

under observation status for further evaluation and management.  I consulted the

cardiologist and talked the case over with him as well.


Initial ECG Impression Date:  Sep 30, 2021


Initial ECG Impression Time:  13:24


Initial ECG Rate:  78


Initial ECG Rhythm:  Normal Sinus


Comment


Narrow QRS, normal axis, significant baseline wandering but accounting for that 

no significant ST elevation or depressions, no T wave abnormalities





Diagnostic Imaging





   Diagonstic Imaging:  Xray


   Plain Films/CT/US/NM/MRI:  chest


Comments


                 [facility] [place] [place]





NAME:   JANINE BELHCER [facility] REC#:   [id number]


ACCOUNT#:   [id number]


PT STATUS:   REG ER


:   1945


PHYSICIAN:   LUCRECIA GALICIA MD


ADMIT DATE:   21/ER FS


                                   ***Draft***


Date of Exam:21





CHEST PA/LAT (2 VIEW)








INDICATION: Short of breath, cough.





EXAMINATION: Two-view chest, 2021.





FINDINGS:





Two views of the chest.





The heart and pulmonary vasculature appear normal. Lungs and


pleural spaces are clear. No pneumothorax or effusions. No acute


osseous abnormality.





IMPRESSION:


1. No acute process.





  Dictated on workstation # [id number]








Dict:   21 [id number]


Trans:   21 1328


 [id number]





Interpreted by:     SANDRINE STEVE MD


Electronically signed by:  








                 [facility] [place] [place]





NAME:   JANINE BELCHER [facility] REC#:   [id number]


ACCOUNT#:   [id number]


PT STATUS:   REG ER


:   1945


PHYSICIAN:   LUCRECIA GALICIA MD


ADMIT DATE:   21/ER FS


                                   ***Draft***


Date of Exam:21





CT ABDOMEN/PELVIS W








EXAMINATION: CT abdomen and pelvis with intravenous contrast.





TECHNIQUE: Multiple contiguous axial images were obtained through


the abdomen and pelvis after the uneventful administration of


intravenous contrast. All CT scans use one or more of the


following dose optimizing techniques: Automated exposure control,


MA and/or KvP adjustment based on patient size and exam type or


iterative reconstruction. 





HISTORY: Prior AAA repair, constipation.





COMPARISON: 2021.





FINDINGS: 





Limited views of the lower thorax are unremarkable.





The liver is normal without focal lesion. There is no biliary


ductal dilation. Stones are present in the gallbladder. Pancreas


is normal.  Spleen is normal. Adrenal glands are normal.





The kidneys are normal. There is no hydronephrosis. Urinary


bladder is normal.





Visualized bowel is normal in caliber without obstruction or


inflammation. There is a fat-containing umbilical hernia. No free


fluid or air. No abdominal or pelvic lymphadenopathy. There are


postsurgical changes of endovascular repair of the abdominal


aorta, and endoleak is present. The source is difficult to


identify due to phase of contrast.





There are no suspicious osseous lesions.





IMPRESSION:





1. Normal bowel without obstruction or inflammation.





2. There has been an endovascular aortic repair, and there is an


endoleak. Due to the phase of contrast, the source of the leak is


difficult to identify.





  Dictated on workstation # [id number]








Dict:   21 1516


Trans:   21 1522


 [id number]





Interpreted by:     ANNA WEINBERG MD


Electronically signed by:





Departure


Impression





   Primary Impression:  


   Volume overload


   Qualified Codes:  E87.70 - Fluid overload, unspecified


   Additional Impression:  


   Orthopnea


Disposition:  30 STILL A PATIENT


Condition:  Stable





Transfer


Transfer Reason:  Patient preference


Time Spoke to Accepting Phy:  15:40


Transfer Progress Notes


Spoke with Dr. Pond who accepts the patient to [facility] the [facility]

surg floor with tele


Method of Transfer:  Private Vehicle





Departure-Patient Inst.


Referrals:  


LITA GREEN MD (PCP/Family)


Primary Care Physician











LUCRECIA GALICIA MD          Sep 30, 2021 12:57

## 2021-09-30 NOTE — XMS REPORT
Clinical Summary

                             Created on: 2021



SusanJakub DEAN

External Reference #: [id number]

: 1945

Sex: Male



Demographics





                          [place]  [address]

 

                          Home Phone                [phone]

 

                          Preferred Language        English

 

                          Marital Status            Single

 

                          Denominational Affiliation     Unknown

 

                          Race                      White

 

                          Ethnic Group              Not  or 





Author





                          Author                    [facility]

 

                          Organization              [facility]

 

                          Address                   Unknown

 

                          Phone                     Unavailable







Support





                Name            Relationship    Address         Phone

 

                    Perla Davis       [address]                 [place]  [address]                   [phone]







Care Team Providers





                    Care Team Member Name Role                Phone

 

                    Self, Luis HERNANDEZ    PCP                 [phone]







Allergies





                                        Comments



                 Active Allergy  Reactions       Severity        Noted Date 

 

                                         



                 Ace Inhibitors  Edema           High            2016 

 

                                         



                           Rosiglitazone             2016 

 

                                         



                           Losartan                  2016 

 

                                         



                           Glipizide                 2016 

 

                                         



                           Atorvastatin              2016 

 

                                         



                           Lisinopril                2016 

 

                                         



                           Benazepril                2016 

 

                                         



                           Lovastatin                2016 

 

                                         



                           Metformin                 2016 

 

                                         



                           Simvastatin               2016 

 

                                         



                 Statins-Hmg-Coa Reductase  Throat edema    High             



                                         Inhibitors    

 

                                         



                           Fenofibrate Micronized    2016 







Medications





                          End Date                  Status



              Medication   Sig          Dispensed    Refills      Start  



                                         Date  

 

                                                    Active



                     omeprazole (PRILOSEC) 20  Take 20 mg by       0   



                           MG capsule                mouth daily.     

 

                                                    Active



                     ezetimibe (ZETIA) 10 mg  Take 10 mg by       0   



                           tablet                    mouth daily.     

 

                                                    Active



                     cetirizine (ZYRTEC) 10 MG  Take 10 mg by       0   



                           tablet                    mouth daily.     

 

                                                    Active



                     fenofibrate (TRICOR) 145  Take 145 mg         0   



                           MG tablet                 by mouth     



                                         daily.     

 

                                                    Active



                     HYDROcodone-acetaminophen  Take 1 tablet       0   



                           (NORCO) 7.5-325 mg per    by mouth     



                           tablet                    every 6 (six)     



                                         hours as     



                                         needed for     



                                         pain.     

 

                                                    Active



                     albuterol           Inhale 2            0   



                           (PROAIR/PROVENTIL/VENTOLI  puffs every 6     



                           N) 90 mcg/actuation HFA   (six) hours     



                           inhaler                   as needed for     



                                         wheezing.     

 

                                                    Active



                     tiotropium (SPIRIVA) 18  Place 2.5 mcg       0   



                           mcg inhalation capsule    into inhaler     



                                         and inhale     



                                         daily.     

 

                                                    Active



                     fluticasone (FLONASE) 50  Use 1 spray         0   



                           mcg/actuation nasal spray  in each     



                                         nostril as     



                                         needed for     



                                         rhinitis.     

 

                                                    Active



                     INSULIN DETEMIR (LEVEMIR  Inject 20           0   



                           FLEXPEN SUBQ)             Units under     



                                         the skin 2     



                                         (two) times a     



                                         day.     

 

                                                    Active



                     liraglutide (VICTOZA) 0.6  Inject 1.8 mg       0   



                           mg/0.1 mL (18 mg/3 mL)    under the     



                           PnIj                      skin daily.     

 

                                                    Active



                     INSULIN ASPART (NOVOLOG  Inject 15           0   



                           SUBQ)                     Units under     



                                         the skin 3     



                                         (three) times     



                                         a day before     



                                         meals.     

 

                                                    Active



              aspirin 325 MG tablet  Take 1 tablet  30 tablet    0              



                           (325 mg                   6  



                                         total) by     



                                         mouth daily.     







Active Problems





                                        Patient Care Coordination Note

 

                                        



Formatting of this note might be different from the original.

                                        71 YO make with the PMH of CAD, COPD, ID

DM-2, tobacco abuse presented to the ER 

through a life flight because of the sudden onset of the Right arm weakness and 
left facial droop with difficulty speaking. He had similar symptom  2 days ago 
and was hospitalized in [facility]SH where he was DC'ed after keeping on a heparin drip. 
In the ER a CTA of head and neck was done that showed no significant lesions. 
His symptoms had resolved within 2 hours of reaching the ER.







 



                           Problem                   Noted Date

 

 



                           Tobacco abuse             06/15/2016

 

 



                           COPD (chronic obstructive pulmonary disease)  

016

 

 



                                         Last Assessment & Plan:



                                         Formatting of this note might be differ

ent from the original.



                                         Stabe, no signs of exacerbation



                                         Will continue on Spiriva



                                         Albuterol PRN  If needed

 

 



                           Chronic systolic congestive heart failure  2016

 

 



                                         Last Assessment & Plan:



                                         Formatting of this note might be differ

ent from the original.



                                         No signs of decompensation



                                         Has been on Lasix in the Past



                                         Will assess the EF on Echo

 

 



                           CAD (coronary artery disease)  2016

 

 



                                         Last Assessment & Plan:



                                         Formatting of this note might be differ

ent from the original.



                                         Will continue on aspirin



                                         Start on Metoprolol 12.5 mg BID



                                         Patient is allergic to ACE

 

 



                           TIA (transient ischemic attack)  2016

 

 



                                         Last Assessment & Plan:



                                         Formatting of this note might be differ

ent from the original.



                                         Currently symptom free



                                         Will given him Aspirin 324 mg once



                                         Start on aspirin 81 mg PO from tomorrow

 AM



                                         Can not get Statin because of allergic 

reaction in the past



                                         Will get Lipid panel, TSH and start on 

Zetia 10 mg and Fenofibrate 160 mg



                                         Neuro consult, likely a MRI head  And e

cho tomorrow



 

 



                           IDDM (insulin dependent diabetes mellitus)  

6

 

 



                                         Overview:



                                         Formatting of this note might be differ

ent from the original.



                                         IMO Update 2020





                                         L



                                         ast Assessment & Plan:



                                         Formatting of this note might be differ

ent from the original.



                                         Will continue on home Novolog 15 units 

BID



                                         Detemir 20 units BID



                                         SSI level 4. Accuchecks ACHS









Family History





   



                 Medical History  Relation        Name            Comments

 

   



                           Cancer                    Brother  

 

   



                           Cancer                    Sister  







   



                 Relation        Name            Status          Comments

 

   



                           Brother                   Alive 

 

   



                           Brother                   Alive 

 

   



                                         Brother   

 

   



                     Father                          COPD and CHF

 

   



                     Mother                          via SUICIDE

 

   



                                         Sister   

 

   



                           Sister                    Alive 

 

   



                           Sister                    Alive 

 

   



                           Sister                    Alive 







Social History





                                        Date



                 Tobacco Use     Types           Packs/Day       Years Used 

 

                                         



                 Current Every Day Smoker  Cigarettes      0.5             60 







                                        Tobacco Cessation: Ready to Quit: Yes

Comments:  5 to 10 cigs a day since 11 years old wit recent cut down to 5-10 
cigs two weeks ago 







                                        Comments



                           Alcohol Use               Standard Drinks/Week 

 

                                         



                           No                        0 (1 standard drink = 0.6 o

z pure alcohol) 







 



                           Sex Assigned at Birth     Date Recorded

 

 



                                         Not on file 







Last Filed Vital Signs





                    Reading             Time Taken          Comments



                                         Vital Sign   

 

                    152/88              2016  3:23 PM CDT  



                                         Blood Pressure   

 

                    84                  2016  3:23 PM CDT  



                                         Pulse   

 

                    36.7 C (98 F)   2016  3:23 PM CDT  



                                         Temperature   

 

                    19                  2016  3:23 PM CDT  



                                         Respiratory Rate   

 

                    93%                 2016  3:23 PM CDT  



                                         Oxygen Saturation   

 

                    -                   -                    



                                         Inhaled Oxygen   



                                         Concentration   

 

                    113.9 kg (251 lb)   2016  2:22 PM CDT  



                                         Weight   

 

                    182.9 cm (6')       2016  2:22 PM CDT  



                                         Height   

 

                    34.04               2016  2:22 PM CDT  



                                         Body Mass Index   







Plan of Treatment





Not on file



Results

Not on filefrom Last 3 Months



Insurance





                                        Type



            Payer      Benefit    Subscriber ID  Effective  Phone      Address 



                           Plan /                    Dates   



                                         Group     

 

                                        Medicare MEDICARE     MEDICARE     [id number]   10/1/1992-   [place] 



                     [address]            [place], [place] 







     



            Guarantor Name  Account    Relation to  Date of    Phone      Yousuf munoz Address



                     Type                Patient             Birth  

 

     



            Jakub Davis  Personal/F  Self       1945  [phone]  1

[address]



                     amily               (Home)              [place] [address]

1

 

     



            Jakub Davis  Personal/F  Self       1945  [phone]  1

[address]



                     amily               (Home)              [place] [address]

1







Advance Directives





For more information, please contact: [phone]







                          Patient Representative    Explanation



                           Type                      Date Recorded  

 

                                                     



                                         Advance Directives   



                                         and Living Will   

 

                                                     



                                         Power of    











                          Date Inactivated          Comments



                           Code Status               Date Activated  

 

                          2016  5:51 PM         



                           Full Code                 2016  4:08 PM

## 2021-09-30 NOTE — DIAGNOSTIC IMAGING REPORT
EXAMINATION: CT abdomen and pelvis with intravenous contrast.



TECHNIQUE: Multiple contiguous axial images were obtained through

the abdomen and pelvis after the uneventful administration of

intravenous contrast. All CT scans use one or more of the

following dose optimizing techniques: Automated exposure control,

MA and/or KvP adjustment based on patient size and exam type or

iterative reconstruction. 



HISTORY: Prior AAA repair, constipation.



COMPARISON: 03/01/2021.



FINDINGS: 



Limited views of the lower thorax are unremarkable.



The liver is normal without focal lesion. There is no biliary

ductal dilation. Stones are present in the gallbladder. Pancreas

is normal.  Spleen is normal. Adrenal glands are normal.



The kidneys are normal. There is no hydronephrosis. Urinary

bladder is normal.



Visualized bowel is normal in caliber without obstruction or

inflammation. There is a fat-containing umbilical hernia. No free

fluid or air. No abdominal or pelvic lymphadenopathy. There are

postsurgical changes of endovascular repair of the abdominal

aorta, and endoleak is present. The source is difficult to

identify due to phase of contrast.



There are no suspicious osseous lesions.



IMPRESSION:



1. Normal bowel without obstruction or inflammation.



2. There has been an endovascular aortic repair, and there is an

endoleak. Due to the phase of contrast, the source of the leak is

difficult to identify.



Dictated by: 



  Dictated on workstation # [id number]

## 2021-09-30 NOTE — DIAGNOSTIC IMAGING REPORT
INDICATION: Short of breath, cough.



EXAMINATION: Two-view chest, 09/30/2021.



FINDINGS:



Two views of the chest.



The heart and pulmonary vasculature appear normal. Lungs and

pleural spaces are clear. No pneumothorax or effusions. No acute

osseous abnormality.



IMPRESSION:

1. No acute process.



Dictated by: 



  Dictated on workstation # [id number]

## 2021-09-30 NOTE — XMS REPORT
Clinical Summary

                             Created on: 2021



Jakub Davis

External Reference #: [id number]

: 1945

Sex: Male



Demographics





                          Address                   [address]

[place]  [address]

 

                          Home Phone                [phone]

 

                          Preferred Language        English

 

                          Marital Status            Single

 

                          Sikh Affiliation     Unknown

 

                          Race                      White

 

                          Ethnic Group              Not  or 





Author





                          Author                    [facility]

 

                          Organization              [facility]

 

                          Address                   Unknown

 

                          Phone                     Unavailable







Support





                Name            Relationship    Address         Phone

 

                Perla Susan   [employer]            Unknown         [phone]







Care Team Providers





                    Care Team Member Name Role                Phone

 

                    Self, Luis HERNANDEZ    PCP                 [phone]







Source Comments

Some departments are not documenting in the electronic medical record.  If you d
o not see the information that you expected, contact Release of Information in [facility] Health Information Management department at [phone] for further assistan
ce in locating additional records.[facility]



Allergies





                                        Comments



                 Active Allergy  Reactions       Severity        Noted Date 

 

                                         



                 Amiodarone      CHEST           Medium          2021 



                                         TIGHTNESS,   



                                         SHORTNESS OF   



                                         BREATH,   



                                         SWEATING   

 

                                         



                 Rosiglitazone   SHORTNESS OF    Medium          2021 



                                         BREATH   

 

                                         



                 Losartan        HIVES           Medium          2021 

 

                                         



                 Glipizide       RASH            Medium          2021 

 

                                         



                 Umeclidinium    SHORTNESS OF    Medium          2021 



                                         BREATH   

 

                                         



                 Atorvastatin    HIVES           Medium          2021 

 

                                         



                 Benazepril      MUSCLE PAIN     Medium          2021 

 

                                         



                 Lovastatin      MUSCLE PAIN     Medium          2021 

 

                                         



                 Metformin       RASH            Medium          2021 

 

                                         



                 Simvastatin     MUSCLE PAIN     Medium          2021 







Medications





                          End Date                  Status



              Medication   Sig          Dispensed    Refills      Start  



                                         Date  

 

                                                    Active



                     aspirin EC 81 mg tablet  Take 81 mg by       0   



                                         mouth daily.     



                                         Take with     



                                         food.     

 

                                                    Active



                     clopiDOGrel (PLAVIX) 75  Take 75 mg by       0   



                           mg tablet                 mouth daily.     

 

                                                    Active



                     fenofibrate         Take 145 mg         0   



                           nanocrystallized (TRICOR)  by mouth     



                           145 mg tablet             daily. Take     



                                         with food.     

 

                                                    Active



                     hydroCHLOROthiazide  Take 12.5 mg        0   



                           (HYDRODIURIL) 12.5 mg     by mouth     



                           tablet                    every     



                                         morning.     

 

                                                    Active



                     furosemide (LASIX) 40 mg  Take 40 mg by       0   



                           tablet                    mouth every     



                                         morning.     

 

                                                    Active



                     pregabalin (LYRICA) 100  Take 100 mg         0   



                           mg capsule                by mouth     



                                         three times     



                                         daily.     

 

                                                    Active



                     meloxicam (MOBIC) 15 mg  Take 15 mg by       0   



                           tablet                    mouth daily.     

 

                                                    Active



                     insulin aspart U-100  Inject 25           0   



                           (NOVOLOG FLEXPEN U-100    Units under     



                           INSULIN) 100 unit/mL (3   the skin     



                           mL) injection PEN         three times     



                                         daily with     



                                         meals.     

 

                                                    Active



                     omeprazole DR (PRILOSEC)  Take 20 mg by       0   



                           20 mg capsule             mouth daily     



                                         before     



                                         breakfast.     

 

                                                    Active



                     rosuvastatin (CRESTOR) 40  Take 40 mg by       0   



                           mg tablet                 mouth daily.     

 

                                                    Active



                     apixaban (ELIQUIS) 5 mg  Take 5 mg by        0   



                           tablet                    mouth twice     



                                         daily.     

 

                                                    Active



                     albuterol (ACCUNEB) 0.63  Inhale 0.63         0   



                           mg/3 mL nebulizer         mg solution     



                           solution                  by nebulizer     



                                         as directed     



                                         every 4 hours     



                                         as needed for     



                                         Wheezing.     

 

                                                    Active



                     fluticasone propionate  Apply 2             0   



                           (FLONASE) 50              sprays to     



                           mcg/actuation nasal       each nostril     



                           spray, suspension         as directed     



                                         daily. Shake     



                                         bottle gently     



                                         before using.     

 

                                                    Active



                     metoprolol XL (TOPROL XL)  Take 50 mg by       0   



                           50 mg extended release    mouth daily.     



                                         tablet      

 

                                                    Active



                     montelukast (SINGULAIR)  Take 10 mg by       0   



                           10 mg tablet              mouth at     



                                         bedtime     



                                         daily.     

 

                                                    Active



              dilTIAZem CD (CARDIZEM  Take one     180 capsule  3              



                     CD) 120 mg capsule  capsule by          1  



                                         mouth twice     



                                         daily.     

 

                                                    Active



                     insulin             Inject 60           0   



                           glargine,hum.rec.anlog    Units under     



                           (BASAGLAR KWIKPEN U-100   the skin     



                           INSULIN SC)               twice daily.     

 

                                                    Active



              MULTAQ 400 mg tablet  TAKE 1 TABLET  60 tablet    11             



                           BY MOUTH                  1  



                                         TWICE DAILY     







Active Problems





 



                           Problem                   Noted Date

 

 



                           Atrial fibrillation       2021

 

 



                                         Overview:



                                         Formatting of this note might be differ

ent from the original.



                                         2021 - ECHO: ([facility])  LV cavity is normal.



                                         Wall thickness is mildly increased.  Th

ere is concentric hypertrophy.



                                         Systolic function is normal.  EF = 50%.

  There were no regional wall motion



                                         abnormalities identified.  Features are

 consistent with a pseudonormal LV



                                         filling pattern, with concomitant abnor

mal relaxation and increased filling



                                         pressure (grade 2 diastolic dysfunction

).  RV cavity is mildly increased.



                                         Wall thickness is normal.  LA is mildly

 to moderately dilated.  4.5 cm.



                                         Mild to moderate MCR.  PASP is 45 - 50m

mHg.



                                         2021 - YAQUELIN + DCCV:  ([facility]

[facility])



                                         Failed to attempt for DCCV with 200 Chris

les.

 

 



                           Palpitations              2021

 

 



                           HTN (hypertension)        2021

 

 



                           HLD (hyperlipidemia)      2021

 

 



                           CAD (coronary artery disease)  2021

 

 



                           DM (diabetes mellitus)    2021

 

 



                           Myopathy                  2021

 

 



                           COPD (chronic obstructive pulmonary disease)  

021

 

 



                           ADHD                      2021

 

 



                           Seasonal allergic rhinitis  2021

 

 



                           TIA (transient ischemic attack)  2021

 

 



                           H/O: CVA (cerebrovascular accident)  2021

 

 



                           Panlobular emphysema      2021

 

 



                           OA (osteoarthritis)       2021

 

 



                           Basal cell carcinoma      2021

 

 



                           Diabetic neuropathy       2021

 

 



                           KENROY (obstructive sleep apnea)  2021

 

 



                                         Overview:



                                         Formatting of this note might be differ

ent from the original.



                                         2020 - Sleep Study:  ([facility])   Moderate KENROY







Encounters





                          Care Team                 Description



                     Date                Type                Specialty  

 

                                        



Rd Pineda MD                     Medication Refill



                     2021          Refill              Cardiology  

 

                                        



Lina Proctor RN                  Other (Call to patient's secondary conta

ct # )



                     2021          Telephone           Cardiology  

 

                                        



Lina Proctor RN                  Other (Prepare for upcoming telehealth v

isit )



                     2021          Telephone           Cardiology  



from Last 3 Months



Immunizations





  



                     Name                Administration Dates  Next Due







Surgical History





   



                 Surgery         Date            Site/Laterality  Comments

 

   



                                         ELECTROCARDIOGRAM   

 

   



                                         CARDIOVERSION   

 

   



                                         DOPPLER ECHOCARDIOGRAPHY   

 

   



                                         CARDIAC CATHERIZATION   







Medical History





  



                     Medical History     Date                Comments

 

  



                           Atrial fibrillation (HCC)  2021 

 

  



                           Palpitations              2021 

 

  



                           HTN (hypertension)        2021 

 

  



                           HLD (hyperlipidemia)      2021 

 

  



                           CAD (coronary artery disease)  2021 

 

  



                           DM (diabetes mellitus) (HCC)  2021 

 

  



                           Myopathy                  2021 

 

  



                           COPD (chronic obstructive pulmonary  2021 



                                         disease) (HCC)  

 

  



                           Adhd                      2021 

 

  



                           Seasonal allergic rhinitis  2021 

 

  



                           TIA (transient ischemic attack)  2021 

 

  



                           H/O: CVA (cerebrovascular accident)  2021 

 

  



                           Panlobular emphysema (HCC)  2021 

 

  



                           OA (osteoarthritis)       2021 

 

  



                           Basal cell carcinoma      2021 

 

  



                           Diabetic neuropathy (HCC)  2021 

 

  



                           KENROY (obstructive sleep apnea)  2021 







Social History





                                        Date



                 Tobacco Use     Types           Packs/Day       Years Used 

 

                                         



                 Former Smoker   Cigarettes      3               2 

 

    



                           Smokeless Tobacco:        Chew  



                                         Current User   







                                        Comments



                           Alcohol Use               Standard Drinks/Week 

 

                                         



                           Never                     0 (1 standard drink = 0.6 o

z pure alcohol) 







  



                     Alcohol Habits      Answer              Date Recorded

 

  



                     How often do you have a drink containing alcohol?  Never   

            2021

 

  



                           How many drinks containing alcohol do you have on  No

t asked 



                                         a typical day when you are drinking?  

 

  



                           How often do you have six or more drinks on one  Not 

asked 



                                         occasion?  

 

  



                           Comment:                  Not asked 







 



                           Sex Assigned at Birth     Date Recorded

 

 



                                         Not on file 







Last Filed Vital Signs





                    Reading             Time Taken          Comments



                                         Vital Sign   

 

                    112/67              2021 10:56 AM CDT  



                                         Blood Pressure   

 

                    59                  2021 10:56 AM CDT  



                                         Pulse   

 

                    -                   -                    



                                         Temperature   

 

                    -                   -                    



                                         Respiratory Rate   

 

                    95%                 2021 10:56 AM CDT  



                                         Oxygen Saturation   

 

                    -                   -                    



                                         Inhaled Oxygen   



                                         Concentration   

 

                    136.1 kg (300 lb)   2021  9:45 AM CDT  



                                         Weight   

 

                    188 cm (6' 2")      2021  9:45 AM CDT  



                                         Height   

 

                    38.52               2021  9:45 AM CDT  



                                         Body Mass Index   







Plan of Treatment





   



                 Health Maintenance  Due Date        Last Done       Comments

 

   



                           MEDICARE ANNUAL WELLNESS  1945  



                                         VISIT   

 

   



                           PNEUMONIA (PPSV23)        1952  



                                         VACCINE (1 of 2 - PPSV23)   

 

   



                           DILATED EYE EXAM          1964  

 

   



                           DTAP/TDAP VACCINES (1 -   1964  



                                         Tdap)   

 

   



                           FOOT EXAM                 1964  

 

   



                           HBA1C                     1964  

 

   



                           HEPATITIS C SCREENING     1964  

 

   



                           MICROALBUMIN              1964  

 

   



                           PHYSICAL (COMPREHENSIVE)  1964  



                                         EXAM   

 

   



                           COLORECTAL CANCER         1996  



                                         SCREENING   

 

   



                           SHINGLES RECOMBINANT      1996  



                                         VACCINE (1 of 2)   

 

   



                           ABDOMINAL AORTIC ANEURYSM  2011  



                                         SCREENING   

 

   



                     INFLUENZA VACCINE   2021 

 

   



                     COVID-19 VACCINE    Completed           2021, 



                                         2021 







Results

Not on filefrom Last 3 Months



Insurance





                                        Type



            Payer      Benefit    Subscriber ID  Effective  Phone      Address 



                           Plan /                    Dates   



                                         Group     

 

                                        Medicare



                 [employer] MEDICARE  [employer]           [id number]    2020-P   



                           MEDICARE                  resent   



                                         PPO     







     



            Guarantor Name  Account    Relation to  Date of    Phone      Yousuf munoz Address



                     Type                Patient             Birth  

 

     



            Jakub Davis  Personal/F  Self       1945  1

[address]               (Home)              [place], [place] [address]

1







Advance Directives





                          Patient Representative    Explanation



                           Type                      Date Recorded  

 

                                                     



                                         Advance   



                                         Directive/DPOA

## 2021-09-30 NOTE — XMS REPORT
Encounter Summary

                             Created on: 2021



Jakub Davis

External Reference #: [id number]

: 1945

Sex: Male



Demographics





                          Address                   [address] [place]  [address]

 

                          Home Phone                [phone]

 

                          Preferred Language        English

 

                          Marital Status            Single

 

                          Hindu Affiliation     Unknown

 

                          Race                      White

 

                          Ethnic Group              Not  or 





Author





                          Author                    [facility]

 

                          Organization              [facility]

 

                          Address                   Unknown

 

                          Phone                     Unavailable







Support





                Name            Relationship    Address         Phone

 

                Perla Davis   [employer]            Unknown         [phone]







Care Team Providers





                    Care Team Member Name Role                Phone

 

                    Self, Luis HERNANDEZ    PCP                 [phone]







Reason for Visit

* 



 



                           Reason                    Comments

 

 



                                         Medication Refill 









Encounter Details





                          Care Team                 Description



                     Date                Type                Department  

 

                                        



Rd Pineda MD



[address]



[place] [address]



[phone] (Fax)                      Medication Refill



                     2021          Refill              Cardiology: Center 

for  



                                         [facility]  



                                         [address]  



                                         [place]  



                                         [id number] [phone]  







Social History





                                        Date



                 Tobacco Use     Types           Packs/Day       Years Used 

 

                                         



                 Former Smoker   Cigarettes      3               2 

 

    



                           Smokeless Tobacco:        Chew  



                                         Current User   







                                        Comments



                           Alcohol Use               Standard Drinks/Week 

 

                                         



                           Never                     0 (1 standard drink = 0.6 o

z pure alcohol) 







  



                     Alcohol Habits      Answer              Date Recorded

 

  



                     How often do you have a drink containing alcohol?  Never   

            2021

 

  



                           How many drinks containing alcohol do you have on  No

t asked 



                                         a typical day when you are drinking?  

 

  



                           How often do you have six or more drinks on one  Not 

asked 



                                         occasion?  

 

  



                           Comment:                  Not asked 







 



                           Sex Assigned at Birth     Date Recorded

 

 



                                         Not on file 



documented as of this encounter



Ordered Prescriptions





                          Start Date                End Date



                 Prescription    Sig             Dispensed       Refills  

 

                          2021                 



                 MULTAQ 400 mg tablet  TAKE 1 TABLET   60 tablet       11  



                                         BY MOUTH    



                                         TWICE DAILY    



documented in this encounter



Plan of Treatment





Not on filedocumented as of this encounter



Visit Diagnoses

Not on filedocumented in this encounter



Discontinued Medications





                          Start Date                End Date



                     Medication          Sig                 Discontinue  



                                         Reason  

 

                          2021



                           dronedarone (MULTAQ) 400  Take one   



                           mg tablet                 tablet by   



                                         mouth twice   



                                         daily with   



                                         meals.   



documented as of this encounter



Additional Health Concerns





 



                           Assessment                Noted Time

 

 



                           A fall risk assessment has been completed for the pat

ient  2021  9:45 AM 

CDT

 

 



                           PHQ-2 Depression Total Score: 0  2021  2:16 PM 

CDT



documented as of this encounter

## 2021-10-01 VITALS — SYSTOLIC BLOOD PRESSURE: 123 MMHG | DIASTOLIC BLOOD PRESSURE: 68 MMHG

## 2021-10-01 VITALS — DIASTOLIC BLOOD PRESSURE: 67 MMHG | SYSTOLIC BLOOD PRESSURE: 120 MMHG

## 2021-10-01 VITALS — DIASTOLIC BLOOD PRESSURE: 58 MMHG | SYSTOLIC BLOOD PRESSURE: 123 MMHG

## 2021-10-01 VITALS — SYSTOLIC BLOOD PRESSURE: 126 MMHG | DIASTOLIC BLOOD PRESSURE: 59 MMHG

## 2021-10-01 VITALS — DIASTOLIC BLOOD PRESSURE: 59 MMHG | SYSTOLIC BLOOD PRESSURE: 126 MMHG

## 2021-10-01 VITALS — SYSTOLIC BLOOD PRESSURE: 110 MMHG | DIASTOLIC BLOOD PRESSURE: 69 MMHG

## 2021-10-01 VITALS — SYSTOLIC BLOOD PRESSURE: 119 MMHG | DIASTOLIC BLOOD PRESSURE: 68 MMHG

## 2021-10-01 VITALS — SYSTOLIC BLOOD PRESSURE: 109 MMHG | DIASTOLIC BLOOD PRESSURE: 67 MMHG

## 2021-10-01 LAB
ALBUMIN SERPL-MCNC: 3.5 GM/DL (ref 3.2–4.5)
ALP SERPL-CCNC: 47 U/L (ref 40–136)
ALT SERPL-CCNC: 24 U/L (ref 0–55)
BILIRUB DIRECT SERPL-MCNC: 0.3 MG/DL (ref 0–0.3)
BILIRUB SERPL-MCNC: 0.6 MG/DL (ref 0.1–1)
BUN/CREAT SERPL: 13
CALCIUM SERPL-MCNC: 9.3 MG/DL (ref 8.5–10.1)
CHLORIDE SERPL-SCNC: 103 MMOL/L (ref 98–107)
CO2 SERPL-SCNC: 24 MMOL/L (ref 21–32)
CREAT SERPL-MCNC: 1.06 MG/DL (ref 0.6–1.3)
GFR SERPLBLD BASED ON 1.73 SQ M-ARVRAT: 68 ML/MIN
GLUCOSE SERPL-MCNC: 117 MG/DL (ref 70–105)
POTASSIUM SERPL-SCNC: 3.3 MMOL/L (ref 3.6–5)
PROT SERPL-MCNC: 6.2 GM/DL (ref 6.4–8.2)
SODIUM SERPL-SCNC: 139 MMOL/L (ref 135–145)

## 2021-10-01 RX ADMIN — Medication SCH ML: at 06:11

## 2021-10-01 RX ADMIN — PREGABALIN SCH MG: 100 CAPSULE ORAL at 13:54

## 2021-10-01 RX ADMIN — CYCLOBENZAPRINE HYDROCHLORIDE SCH MG: 10 TABLET, FILM COATED ORAL at 10:27

## 2021-10-01 RX ADMIN — FUROSEMIDE SCH MG: 10 INJECTION, SOLUTION INTRAVENOUS at 06:11

## 2021-10-01 RX ADMIN — APIXABAN SCH MG: 5 TABLET, FILM COATED ORAL at 21:01

## 2021-10-01 RX ADMIN — POTASSIUM CHLORIDE SCH MEQ: 1500 TABLET, EXTENDED RELEASE ORAL at 06:13

## 2021-10-01 RX ADMIN — INSULIN ASPART SCH UNIT: 100 INJECTION, SOLUTION INTRAVENOUS; SUBCUTANEOUS at 17:21

## 2021-10-01 RX ADMIN — Medication SCH ML: at 14:00

## 2021-10-01 RX ADMIN — IPRATROPIUM BROMIDE AND ALBUTEROL SULFATE SCH ML: .5; 3 SOLUTION RESPIRATORY (INHALATION) at 21:34

## 2021-10-01 RX ADMIN — DILTIAZEM HYDROCHLORIDE SCH MG: 120 CAPSULE, COATED, EXTENDED RELEASE ORAL at 10:27

## 2021-10-01 RX ADMIN — PREGABALIN SCH MG: 100 CAPSULE ORAL at 21:02

## 2021-10-01 RX ADMIN — METOPROLOL SUCCINATE SCH MG: 50 TABLET, EXTENDED RELEASE ORAL at 10:26

## 2021-10-01 RX ADMIN — INSULIN ASPART SCH UNIT: 100 INJECTION, SOLUTION INTRAVENOUS; SUBCUTANEOUS at 06:14

## 2021-10-01 RX ADMIN — INSULIN ASPART SCH UNIT: 100 INJECTION, SOLUTION INTRAVENOUS; SUBCUTANEOUS at 12:33

## 2021-10-01 RX ADMIN — PREGABALIN SCH MG: 100 CAPSULE ORAL at 10:27

## 2021-10-01 RX ADMIN — CYCLOBENZAPRINE HYDROCHLORIDE SCH MG: 10 TABLET, FILM COATED ORAL at 21:02

## 2021-10-01 RX ADMIN — INSULIN ASPART SCH UNIT: 100 INJECTION, SOLUTION INTRAVENOUS; SUBCUTANEOUS at 21:02

## 2021-10-01 RX ADMIN — Medication SCH ML: at 21:02

## 2021-10-01 RX ADMIN — APIXABAN SCH MG: 5 TABLET, FILM COATED ORAL at 10:27

## 2021-10-01 RX ADMIN — CLOPIDOGREL BISULFATE SCH MG: 75 TABLET, FILM COATED ORAL at 10:27

## 2021-10-01 NOTE — HISTORY & PHYSICAL-HOSPITALIST
History of Present Illness


HPI/Chief Complaint


Chief complaint: Shortness of breath





History of present illness: This is a 76-year-old white male past medical 

history of atrial fibrillation who presents with recurrent congestive heart 

failure volume overload.  Currently he reports feeling much better.  

Echocardiogram ordered along with cardiology consult.  Pulmonary consult will 

also be added due to suspicion of untreated sleep apnea.  BMI 39.  Home meds 

will be restarted.  Hep-Lock and IV fluid.


Source:  patient


Exam Limitations:  no limitations


Date Seen


10/1/21


Time Seen by a Provider:  11:00


Attending Physician


Xochitl Pond MD


PCP


Self,Luis HERNANDEZ


Referring Physician





Date of Admission


Sep 30, 2021 at 17:27





Home Medications & Allergies


Home Medications


Reviewed patient Home Medication Reconciliation performed by pharmacy medication

reconciliations technician and/or nursing.


Patients Allergies have been reviewed.





Allergies





Allergies


Coded Allergies


  lisinopril (Verified Allergy, Mild, 5/12/21)


    MUSCLE ACHES


  losartan (Verified Allergy, Mild, Rash, 5/12/21)


  lovastatin (Verified Allergy, Mild, 5/12/21)


    MUSCLE ACHES


  rosiglitazone (Verified Allergy, Mild, Hives, 5/12/21)


  umeclidinium (Verified Allergy, Mild, Rash, 5/12/21)








Past Medical-Social-Family Hx


Patient Social History


Marrital Status:  single


Employed/Student:  retired


Tobacco Use?:  No


Smoking Status:  Former Smoker


Smokeless type used:  Chew


Smokeless Tobacco Frequency:  Light User


Use of E-Cig and/or Vaping dev:  No


Substance use?:  No


Alcohol Use?:  Yes


Alcohol type:  Hard Liquor


Alcohol Frequency:  Rarely


Pt feels they are or have been:  No





Immunizations Up To Date


Date of Influenza Vaccine:  Oct 1, 2020


First/Initial COVID19 Vaccinat:  APRIL 2021


Second COVID19 Vaccination Alberto:  MAY 2021


Tetanus Booster (TDap):  More Than 5 Years


Hepatitis A:  No


Hepatitis B:  No





Current Status


Advance Directives:  No


Communicates:  Verbally


Primary Language:  English


Preferred Spoken Language:  English


Is interpretation needed?:  Yes


Sensory deficits:  Vision impairment, Hearing impairment


Implanted or Applied Medical D:  Stents





Past Medical History


Surgeries:  Appendectomy


Emphysema


Atrial Fibrillation, Coronary Artery Disease, High Cholesterol, Hypertension


TIA


Gastroesophageal Reflux


Skin


Did You Recieve Any Treatments:  Yes


What Type of Treatment Did You:  Surgical Intervention


Blood Disorders:  No





Review of Systems


Constitutional:  see HPI, malaise, weakness


EENTM:  no symptoms reported


Respiratory:  short of breath


Cardiovascular:  no symptoms reported


Gastrointestinal:  no symptoms reported


Genitourinary:  no symptoms reported


Musculoskeletal:  no symptoms reported


Skin:  no symptoms reported


Psychiatric/Neurological:  No Symptoms Reported


All Other Systems Reviewed


Negative Unless Noted:  Yes





Physical Exam


Physical Exam


Vital Signs





Vital Signs - First Documented








 9/30/21 9/30/21





 12:48 22:15


 


Temp 36.0 


 


Pulse 82 


 


Resp 26 


 


B/P (MAP) 157/80 (105) 


 


Pulse Ox 96 


 


O2 Delivery Room Air 


 


FiO2  21





Capillary Refill : Less Than 3 Seconds


Height, Weight, BMI


Height: '"


Weight: lbs. oz. kg; 39.12 BMI


Method:


General Appearance:  No Apparent Distress, Anxious, Chronically ill, Obese


Eyes:  Right Eye Normal Inspection, Right Eye PERRL


HEENT:  PERRL/EOMI, Normal ENT Inspection, Pharynx Normal, Moist Mucous Membr

anes


Neck:  Full Range of Motion, Normal Inspection, Non Tender


Respiratory:  Chest Non Tender, Lungs Clear, No Accessory Muscle Use, No 

Respiratory Distress, Decreased Breath Sounds


Cardiovascular:  Regular Rate, Rhythm, No Edema, No Gallop, No JVD, No Murmur, 

Normal Peripheral Pulses, Irregularly Irregular


Gastrointestinal:  Normal Bowel Sounds, No Organomegaly, No Pulsatile Mass, Non 

Tender, Soft


Back:  Normal Inspection, No CVA Tenderness, No Vertebral Tenderness


Extremity:  Normal Capillary Refill, Normal Inspection, Normal Range of Motion, 

Non Tender, No Calf Tenderness, No Pedal Edema


Neurologic/Psychiatric:  Alert, Oriented x3, No Motor/Sensory Deficits, Normal 

Mood/Affect


Skin:  Normal Color, Warm/Dry


Lymphatic:  No Adenopathy





Results


Results/Procedures


Labs


Laboratory Tests


9/30/21 13:30








10/1/21 05:30








Patient resulted labs reviewed.





Assessment/Plan


Admission Diagnosis


Assessment:


Acute volume overload


History of congestive heart failure


Atrial fibrillation


Hypertension


Diabetes mellitus


Suspicion for KENROY undiagnosed


Elevated BMI





Plan:


Supportive care


Cardiology consult


Pulmonary consult


Home meds


Admission Status:  Inpatient Order (span 2 midnights)


Reason for Inpatient Admission:  


Volume overload





Diagnosis/Problems


Diagnosis/Problems





(1) Volume overload


Status:  Acute


Qualifiers:  


   Hypervolemia type:  unspecified  Qualified Codes:  E87.70 - Fluid overload, 

unspecified


(2) Afib


(3) Orthopnea


Status:  Acute











MENDEL OLIVERA DO                 Oct 1, 2021 12:03

## 2021-10-01 NOTE — CONSULTATION-CARDIOLOGY
HPI-Cardiology


Cardiology Consultation:


Date of Consultation


10/1/21


Time Seen by a Provider:  09:30


Date of Admission


21


Attending Physician


Xochitl Pond MD


Admitting Physician


Luis Mcpherson MD


Consulting Physician


Amairani Vargas MD





Primary Cardiologist:


Dr. Stearns





HPI:


Chief Complaint:


CHF


Mr. Belcher is a 76 yr old male who has been admitted to [facility] from the ED.  He 

notes a feeling of abdominal distention and has some constipation and has been 

using multiple stool softeners/laxative. He had quit his Lasix approx a week ago

d/t leg cramps.  He reports nearly a 30 lb weight gain over the last week.  He 

reports increasing leg swelling over the last 4 days.  He reports increasing SOB

over the last 4 days.  He continues to feel SOB.  He reports he takes Plavix, 

ASA and Eliquis.  He reports AAA repair by Dr. Rivera in 2021, but did not

follow up; he states he was told last night he has a leak.  He denies any c/o CP

or palpitations.





Review of Systems-Cardiology


Review of Systems


Constitutional:  No chills, No fever; malaise


Eyes:  No vision change


Ears/Nose/Throat:  No epistaxis, No recent hearing loss


Respiratory:  As described under HPI


Cardiovascular:  As described under HPI


Gastrointestinal:  As described under HPI, constipation; No diarrhea, No nausea,

No vomiting


Genitourinary:  No hematuria


Musculoskeletal:  no symptoms reported


Skin:  No rash on exposed areas, No ulcerations on exposed areas


Psychiatric/Neurological:  No anxiety, No depression, No seizure, No focal 

weakness, No syncope


Hematologic:  No bleeding abnormalities





All Other Systems Reviewed


Negative Unless Noted:  Yes





PMH-Social-Family Hx


Patient Social History


Smoking Status:  Former Smoker


Have you traveled recently?:  No


Alcohol Use?:  Yes


Pt feels they are or have been:  No





Immunizations Up To Date


Date of Influenza Vaccine:  Oct 1, 2020





Past Medical History


PMH


As described under Assessment.





Family Medical History


Family Medical History:  


No reported family h/o.





Allergies and Home Medications


Allergies


Coded Allergies:  


     lisinopril (Verified  Allergy, Mild, 21)


   MUSCLE ACHES


     losartan (Verified  Allergy, Mild, Rash, 21)


     lovastatin (Verified  Allergy, Mild, 21)


   MUSCLE ACHES


     rosiglitazone (Verified  Allergy, Mild, Hives, 21)


     umeclidinium (Verified  Allergy, Mild, Rash, 21)





Patient Home Medication List


Home Medication List Reviewed:  Yes


Albuterol Sulfate (Proair Hfa) 1 Puff Puff, 2 PUFF IH Q4H PRN for SHORTNESS OF 

BREATH, (Reported)


   Entered as Reported by: TEDDY ROMAN on 21


   Last Action: Held


Apixaban (Eliquis) 5 Mg Tablet, 5 MG PO BID, (Reported)


   Entered as Reported by: TEDDY ROMAN on 21


   Last Action: Continued


Clopidogrel Bisulfate (Clopidogrel) 75 Mg Tablet, 75 MG PO DAILY, (Reported)


   Entered as Reported by: TEDDY ROMAN on 21


   Last Action: Continued


Diltiazem HCl (Diltiazem 24Hr ER) 120 Mg Cap.er.24h, 120 MG PO DAILY, (Reported)


   Entered as Reported by: JULIETA DUEÑAS on 10/1/21 1124


   Last Action: Continued


Dronedarone HCl (Multaq) 400 Mg Tablet, 400 MG PO BID, (Reported)


   Entered as Reported by: JULIETA DUEÑAS on 10/1/21 1124


   Last Action: Continued


Fenofibrate Nanocrystallized (Fenofibrate) 145 Mg Tablet, 145 MG PO DAILY, 

(Reported)


   Entered as Reported by: TEDDY ROMAN on 21


   Last Action: Converted


Fluticasone Propionate (Fluticasone Propionate) 16 Gm [place].susp, 2 SPRAYS 

NSEACH DAILY, (Reported)


   Entered as Reported by: JULIETA DUEÑAS on 10/1/21 1124


   Last Action: Continued


Furosemide (Furosemide) 40 Mg Tablet, 40 MG PO BID PRN for FLUID RETENTION, 

(Reported)


   Entered as Reported by: TEDDY ROMAN on 21


   Last Action: Continued


Hydrochlorothiazide (Hydrochlorothiazide) 12.5 Mg Tablet, 12.5 MG PO DAILY PRN 

for FLUID RETENTION, (Reported)


   Entered as Reported by: TEDDY ROMAN on 21


   Last Action: Held


Hydrocodone/Acetaminophen (Hydrocodone-Acetamin 7.5-325) 1 Each Tablet, 1 EA PO 

Q6H PRN for PAIN-MODERATE (5-7), (Reported)


   Entered as Reported by: JULIETA DUÑEAS on 10/1/21 1124


   Last Action: Continued


Insulin Aspart (Novolog Flexpen) 300 Units/3 Ml Solution, 25 UNITS SQ TIDWM, 

(Reported)


   Entered as Reported by: TEDDY ROMAN on 21


   Last Action: Converted


Insulin Glargine,Hum.rec.anlog (Basaglar Kwikpen U-100) 100 Unit/1 Ml 

Insuln.pen, 60 UNIT SQ BID, (Reported)


   Entered as Reported by: TEDDY ROMAN on 21


   Last Action: Converted


Meloxicam (Meloxicam) 15 Mg Tablet, 15 MG PO DAILY, (Reported)


   Entered as Reported by: TEDDY ROMAN on 21


   Last Action: Held


Metoprolol Succinate (Metoprolol Succinate) 50 Mg Tab.er.24h, 50 MG PO DAILY, 

(Reported)


   Entered as Reported by: TEDDY ROMAN on 21


   Last Action: Continued


Montelukast Sodium (Montelukast Sodium) 10 Mg Tablet, 10 MG PO HS, (Reported)


   Entered as Reported by: TEDDY ROMAN on 21


   Last Action: Continued


Omeprazole (Omeprazole) 20 Mg Capsule.dr, 20 MG PO DAILY, (Reported)


   Entered as Reported by: TEDDY ROMAN on 21


   Last Action: Continued


Pregabalin (Pregabalin) 100 Mg Capsule, 100 MG PO TID, (Reported)


   Entered as Reported by: TEDDY ROMAN on 21


   Last Action: Continued


Rosuvastatin Calcium (Rosuvastatin Calcium) 40 Mg Tablet, 40 MG PO DAILY, 

(Reported)


   Entered as Reported by: TEDDY ROMAN on 21


   Last Action: Converted


Spironolactone (Spironolactone) 25 Mg Tablet, 25 MG PO DAILY PRN for FLUID 

RETENTION, (Reported)


   Entered as Reported by: JULIETA DUEÑAS on 10/1/21 1124


   Last Action: Continued


Discontinued Medications


Diltiazem HCl (Cardizem Cd) 120 Mg Cap.er.24h, 120 MG PO DAILY, (Reported)


   Discontinued Reason: Duplicate Order


   Entered as Reported by: TEDDY ROMAN on 21


   Last Action: Discontinued


Fluticasone Propionate (Flonase Allergy Relief) 9.9 Ml [place].susp, 2 SPRAY NS 

DAILY PRN for ALLERGIES, (Reported)


   Discontinued Reason: Duplicate Order


   Entered as Reported by: TEDDY ROMAN on 21


   Last Action: Discontinued


Ipratropium/Albuterol Sulfate (Iprat-Albut 0.5-3(2.5) mg/3 ml) 3 Ml Ampul.neb, 3

ML IH Q6H PRN for SHORTNESS OF BREATH, (Reported)


   Discontinued Reason: No Longer Taking


   Entered as Reported by: TEDDY ROMAN on 21


   Last Action: Discontinued





Physical Exam-Cardiology


Physical Exam


Vital Signs/I&O











 10/2/21 10/2/21 10/2/21 10/2/21





 01:00 03:43 07:00 07:49


 


Temp  36.4  35.5


 


Pulse 68 56 67 64


 


Resp  20  20


 


B/P (MAP)  113/64 (80)  119/68 (85)


 


Pulse Ox  92  93


 


O2 Delivery  Room Air  


 


    





 10/2/21 10/2/21 10/2/21 





 08:00 09:16 11:14 


 


Temp   35.6 


 


Pulse   85 


 


Resp   20 


 


B/P (MAP)   138/60 (86) 


 


Pulse Ox  90 93 


 


O2 Delivery Room Air Room Air  


 


O2 Flow Rate  0.00  














 10/1/21





 23:59


 


Intake Total 3730 ml


 


Output Total 3775 ml


 


Balance -45 ml





Capillary Refill : Less Than 3 Seconds


Constitutional:  AAO x 3, well-developed, well-nourished


HEENT:  PERRL, hearing is well preserved, oral hygience is good


Neck:  No carotid bruit; carotid pulses are 2 + bilaterally


Respiratory:  No accessory muscle use, No respiratory distress; chest expansion 

is symmetric, chest is bilaterally symmetric, rhonchi (scattered rhonchi)


Cardiovascular:  regular rate-rhythm; No JVD; S1 and S2


Gastrointestinal:  soft, round, distended, hernia (umbilical), audible bowel 

sounds


Extremities:  other (mod bilat LE swelling)


Neurologic/Psychiatric:  grossly intact (moves all extremities)


Skin:  No rash on exposed areas, No ulcerations on exposed areas


Lymphatic:  no adenopathy





Data Review


Labs


Laboratory Tests


10/1/21 15:51: Glucometer 225H


10/1/21 20:43: Glucometer 220H


10/2/21 05:42: 


White Blood Count 6.5, Red Blood Count 4.61, Hemoglobin 10.4L, Hematocrit 35L, 

Mean Corpuscular Volume 77L, Mean Corpuscular Hemoglobin 23L, Mean Corpuscular 

Hemoglobin Concent 29L, Red Cell Distribution Width 16.1H, Platelet Count 282, 

Mean Platelet Volume 11.0, Immature Granulocyte % (Auto) 1, Neutrophils (%) 

(Auto) 51, Lymphocytes (%) (Auto) 30, Monocytes (%) (Auto) 12, Eosinophils (%) 

(Auto) 5, Basophils (%) (Auto) 1, Neutrophils # (Auto) 3.3, Lymphocytes # (Auto)

2.0, Monocytes # (Auto) 0.8, Eosinophils # (Auto) 0.4H, Basophils # (Auto) 0.0, 

Immature Granulocyte # (Auto) 0.1, Sodium Level 138, Potassium Level 4.0, 

Chloride Level 104, Carbon Dioxide Level 24, Anion Gap 10, Blood Urea Nitrogen 

11, Creatinine 0.86, Estimat Glomerular Filtration Rate 87, BUN/Creatinine Ratio

13, Glucose Level 172H, Calcium Level 9.3, Corrected Calcium 9.8, Magnesium 

Level 2.0, Total Bilirubin 0.5, Aspartate Amino Transf (AST/SGOT) 29, Alanine 

Aminotransferase (ALT/SGPT) 24, Alkaline Phosphatase 48, Total Protein 6.0L, 

Albumin 3.4


10/2/21 11:11: Glucometer 317H








Radiology


NAME:   JANINE BELCHER


[facility] REC#:   [id number]


ACCOUNT#:   [id number]


PT STATUS:   DEP ER


:   1945


PHYSICIAN:   LUCRECIA GALICIA MD


ADMIT DATE:   21/ER FS


***Signed***


Date of Exam:21





CT ABDOMEN/PELVIS W








EXAMINATION: CT abdomen and pelvis with intravenous contrast.





TECHNIQUE: Multiple contiguous axial images were obtained through


the abdomen and pelvis after the uneventful administration of


intravenous contrast. All CT scans use one or more of the


following dose optimizing techniques: Automated exposure control,


MA and/or KvP adjustment based on patient size and exam type or


iterative reconstruction. 





HISTORY: Prior AAA repair, constipation.





COMPARISON: 2021.





FINDINGS: 





Limited views of the lower thorax are unremarkable.





The liver is normal without focal lesion. There is no biliary


ductal dilation. Stones are present in the gallbladder. Pancreas


is normal.  Spleen is normal. Adrenal glands are normal.





The kidneys are normal. There is no hydronephrosis. Urinary


bladder is normal.





Visualized bowel is normal in caliber without obstruction or


inflammation. There is a fat-containing umbilical hernia. No free


fluid or air. No abdominal or pelvic lymphadenopathy. There are


postsurgical changes of endovascular repair of the abdominal


aorta, and endoleak is present. The source is difficult to


identify due to phase of contrast.





There are no suspicious osseous lesions.





IMPRESSION:





1. Normal bowel without obstruction or inflammation.





2. There has been an endovascular aortic repair, and there is an


endoleak. Due to the phase of contrast, the source of the leak is


difficult to identify.





Dictated by: 





  Dictated on workstation # [id number]








Dict:   21 1516


Trans:   21 1642


 [id number]





Interpreted by:     ANNA WEINBERG MD





ECG Impression


ECG


Initial ECG Rhythm:  Normal Sinus





A/P-Cardiology


Assessment/Admission Diagnosis


Abdominal bloating of undetermined etiology


- medical services managing





P. Atrial fibrillation  


- Eliquis for stroke prophylaxis


- Failed cardioversion by Dr. Stearns on 21


- YAQUELIN prior to cardioversion on 21 by Dr. Stearns showed dilated LA.  No left

atrial appendage clot seen.  LVEF 50%.  Mild MR.


- H/o cardioversion on 21 at [facility] by Dr. Gardner - currently SR on EKG of 

21





H/O multiple TIA's in the past





Coronary artery disease


- history of multiple interventions in the past, reporting that he had multiple 

stents done at [facility] in the remote past - details unknown


- Plavix and ASA





Abdominal aortic aneurysm


- workup showed that the aneurysm measured 5 cm in the infrarenal abdominal 

aorta, he has bilateral iliac aneurysm


- s/p AAA repair done by Dr. Rivera in 2021


- Per CT of the abdomen on 21:  there is an endoleak. Due to the phase of 

contrast, the source of the leak is difficult to identify.





Hypertension


- controlled





Hyperlipidemia


- statin tx





Diabetes mellitus


-  followed and managed by primary care physician





History of sleep apnea


- was intolerant to C Pap machine





Obesity


- BMI 38, we discussed weight loss.





Tobaccoism


- stopped smoking 2 years ago





COPD





Discussion and Recomendations





There is no clinical or radiologic or BNP evidence of any decompensated CHF


Sypmtoms of shortess of breath appear to be due to obesity hypovent syndrome and

ac exac of COPD


Hypokalemia is likely due to aggressive diuresis. We recommending reducing Lasix

dose and changing to oral administration


Abdominal distention does not appear to be of cardiac origin. Management of this

is by Dr Grigsby who is patient's attending during this admission





Continue Eliquis d/t PAF. Stop ASA, but continue Plavix d/t known h/o CAD; 

having him on all 3 agents increases his risk of bleeding


Monitor lab closely


Replace electrolytes as indicated


Further recs will be based on his hospital course








This is late entry for my H&P that I conducted with DASIA Clark,  on 

10/1/21 at 9:30 am


My changes are in italics











IZABEL PAEZ [facility]            Oct 1, 2021 09:43


AMAIRANI VARGAS MD FACP [facility]    Oct 2, 2021 12:27

## 2021-10-01 NOTE — DIAGNOSTIC IMAGING REPORT
EXAMINATION: Chest, one view.



HISTORY: Fluid retention.



COMPARISON: 05/12/2021.



FINDINGS: 



The lungs are clear without edema or pneumonia. No pleural

effusion or pneumothorax. Heart size is normal.



IMPRESSION: 



1. Clear lungs.



Dictated by: 



  Dictated on workstation # [id number]

## 2021-10-02 VITALS — SYSTOLIC BLOOD PRESSURE: 128 MMHG | DIASTOLIC BLOOD PRESSURE: 71 MMHG

## 2021-10-02 VITALS — SYSTOLIC BLOOD PRESSURE: 138 MMHG | DIASTOLIC BLOOD PRESSURE: 60 MMHG

## 2021-10-02 VITALS — DIASTOLIC BLOOD PRESSURE: 68 MMHG | SYSTOLIC BLOOD PRESSURE: 119 MMHG

## 2021-10-02 VITALS — DIASTOLIC BLOOD PRESSURE: 66 MMHG | SYSTOLIC BLOOD PRESSURE: 115 MMHG

## 2021-10-02 VITALS — DIASTOLIC BLOOD PRESSURE: 64 MMHG | SYSTOLIC BLOOD PRESSURE: 113 MMHG

## 2021-10-02 VITALS — DIASTOLIC BLOOD PRESSURE: 61 MMHG | SYSTOLIC BLOOD PRESSURE: 115 MMHG

## 2021-10-02 LAB
ALBUMIN SERPL-MCNC: 3.4 GM/DL (ref 3.2–4.5)
ALP SERPL-CCNC: 48 U/L (ref 40–136)
ALT SERPL-CCNC: 24 U/L (ref 0–55)
BASOPHILS # BLD AUTO: 0 10^3/UL (ref 0–0.1)
BASOPHILS NFR BLD AUTO: 1 % (ref 0–10)
BILIRUB SERPL-MCNC: 0.5 MG/DL (ref 0.1–1)
BUN/CREAT SERPL: 13
CALCIUM SERPL-MCNC: 9.3 MG/DL (ref 8.5–10.1)
CHLORIDE SERPL-SCNC: 104 MMOL/L (ref 98–107)
CO2 SERPL-SCNC: 24 MMOL/L (ref 21–32)
CREAT SERPL-MCNC: 0.86 MG/DL (ref 0.6–1.3)
EOSINOPHIL # BLD AUTO: 0.4 10^3/UL (ref 0–0.3)
EOSINOPHIL NFR BLD AUTO: 5 % (ref 0–10)
GFR SERPLBLD BASED ON 1.73 SQ M-ARVRAT: 87 ML/MIN
GLUCOSE SERPL-MCNC: 172 MG/DL (ref 70–105)
HCT VFR BLD CALC: 35 % (ref 40–54)
HGB BLD-MCNC: 10.4 G/DL (ref 13.3–17.7)
LYMPHOCYTES # BLD AUTO: 2 10^3/UL (ref 1–4)
LYMPHOCYTES NFR BLD AUTO: 30 % (ref 12–44)
MAGNESIUM SERPL-MCNC: 2 MG/DL (ref 1.6–2.4)
MANUAL DIFFERENTIAL PERFORMED BLD QL: NO
MCH RBC QN AUTO: 23 PG (ref 25–34)
MCHC RBC AUTO-ENTMCNC: 29 G/DL (ref 32–36)
MCV RBC AUTO: 77 FL (ref 80–99)
MONOCYTES # BLD AUTO: 0.8 10^3/UL (ref 0–1)
MONOCYTES NFR BLD AUTO: 12 % (ref 0–12)
NEUTROPHILS # BLD AUTO: 3.3 10^3/UL (ref 1.8–7.8)
NEUTROPHILS NFR BLD AUTO: 51 % (ref 42–75)
PLATELET # BLD: 282 10^3/UL (ref 130–400)
PMV BLD AUTO: 11 FL (ref 9–12.2)
POTASSIUM SERPL-SCNC: 4 MMOL/L (ref 3.6–5)
PROT SERPL-MCNC: 6 GM/DL (ref 6.4–8.2)
SODIUM SERPL-SCNC: 138 MMOL/L (ref 135–145)
WBC # BLD AUTO: 6.5 10^3/UL (ref 4.3–11)

## 2021-10-02 RX ADMIN — FENOFIBRATE SCH MG: 134 CAPSULE ORAL at 08:59

## 2021-10-02 RX ADMIN — INSULIN ASPART SCH UNIT: 100 INJECTION, SOLUTION INTRAVENOUS; SUBCUTANEOUS at 18:01

## 2021-10-02 RX ADMIN — INSULIN ASPART SCH UNIT: 100 INJECTION, SOLUTION INTRAVENOUS; SUBCUTANEOUS at 20:09

## 2021-10-02 RX ADMIN — INSULIN ASPART SCH UNIT: 100 INJECTION, SOLUTION INTRAVENOUS; SUBCUTANEOUS at 11:17

## 2021-10-02 RX ADMIN — PREGABALIN SCH MG: 100 CAPSULE ORAL at 20:15

## 2021-10-02 RX ADMIN — PANTOPRAZOLE SCH MG: 20 TABLET, DELAYED RELEASE ORAL at 06:48

## 2021-10-02 RX ADMIN — ROSUVASTATIN CALCIUM SCH MG: 20 TABLET, FILM COATED ORAL at 08:58

## 2021-10-02 RX ADMIN — CYCLOBENZAPRINE HYDROCHLORIDE SCH MG: 10 TABLET, FILM COATED ORAL at 20:15

## 2021-10-02 RX ADMIN — DILTIAZEM HYDROCHLORIDE SCH MG: 120 CAPSULE, COATED, EXTENDED RELEASE ORAL at 09:01

## 2021-10-02 RX ADMIN — DRONEDARONE SCH MG: 400 TABLET, FILM COATED ORAL at 20:15

## 2021-10-02 RX ADMIN — Medication SCH ML: at 14:26

## 2021-10-02 RX ADMIN — PREGABALIN SCH MG: 100 CAPSULE ORAL at 14:26

## 2021-10-02 RX ADMIN — CYCLOBENZAPRINE HYDROCHLORIDE SCH MG: 10 TABLET, FILM COATED ORAL at 08:59

## 2021-10-02 RX ADMIN — FLUTICASONE PROPIONATE SCH SPRAY: 50 SPRAY, METERED NASAL at 08:57

## 2021-10-02 RX ADMIN — INSULIN ASPART SCH UNIT: 100 INJECTION, SOLUTION INTRAVENOUS; SUBCUTANEOUS at 16:11

## 2021-10-02 RX ADMIN — APIXABAN SCH MG: 5 TABLET, FILM COATED ORAL at 20:15

## 2021-10-02 RX ADMIN — Medication SCH ML: at 06:42

## 2021-10-02 RX ADMIN — Medication SCH ML: at 20:55

## 2021-10-02 RX ADMIN — ALBUTEROL SULFATE SCH MG: 2.5 SOLUTION RESPIRATORY (INHALATION) at 19:56

## 2021-10-02 RX ADMIN — INSULIN ASPART SCH UNIT: 100 INJECTION, SOLUTION INTRAVENOUS; SUBCUTANEOUS at 09:03

## 2021-10-02 RX ADMIN — DRONEDARONE SCH MG: 400 TABLET, FILM COATED ORAL at 08:59

## 2021-10-02 RX ADMIN — POTASSIUM CHLORIDE SCH MEQ: 1500 TABLET, EXTENDED RELEASE ORAL at 06:43

## 2021-10-02 RX ADMIN — INSULIN ASPART SCH UNIT: 100 INJECTION, SOLUTION INTRAVENOUS; SUBCUTANEOUS at 06:32

## 2021-10-02 RX ADMIN — FUROSEMIDE SCH MG: 40 TABLET ORAL at 09:00

## 2021-10-02 RX ADMIN — APIXABAN SCH MG: 5 TABLET, FILM COATED ORAL at 09:04

## 2021-10-02 RX ADMIN — METOPROLOL SUCCINATE SCH MG: 50 TABLET, EXTENDED RELEASE ORAL at 08:58

## 2021-10-02 RX ADMIN — IPRATROPIUM BROMIDE AND ALBUTEROL SULFATE SCH ML: .5; 3 SOLUTION RESPIRATORY (INHALATION) at 09:16

## 2021-10-02 RX ADMIN — MONTELUKAST SCH MG: 10 TABLET, FILM COATED ORAL at 20:15

## 2021-10-02 RX ADMIN — PREGABALIN SCH MG: 100 CAPSULE ORAL at 09:04

## 2021-10-02 RX ADMIN — CLOPIDOGREL BISULFATE SCH MG: 75 TABLET, FILM COATED ORAL at 09:00

## 2021-10-02 RX ADMIN — INSULIN ASPART SCH UNIT: 100 INJECTION, SOLUTION INTRAVENOUS; SUBCUTANEOUS at 14:26

## 2021-10-02 NOTE — PROGRESS NOTE - CARDIOLOGY
Cardiology SOAP Progress Note


Subjective:


Abd distention is better


No cp or palp or syncope or shortness of breath at rest


Gen weakness and malaise


No n/v/d





Objective:


I&O/Vital Signs











 10/2/21 10/2/21 10/2/21 10/2/21





 03:43 07:00 07:49 08:00


 


Temp 36.4  35.5 


 


Pulse 56 67 64 


 


Resp 20  20 


 


B/P (MAP) 113/64 (80)  119/68 (85) 


 


Pulse Ox 92  93 


 


O2 Delivery Room Air   Room Air


 


    





 10/2/21 10/2/21 10/2/21 





 09:16 11:14 12:53 


 


Temp  35.6  


 


Pulse  85 76 


 


Resp  20  


 


B/P (MAP)  138/60 (86)  


 


Pulse Ox 90 93  


 


O2 Delivery Room Air   


 


O2 Flow Rate 0.00   














 10/2/21





 00:00


 


Intake Total 3730 ml


 


Output Total 3775 ml


 


Balance -45 ml








Constitutional:  AAO x 3, well-developed, well-nourished


Respiratory:  No accessory muscle use, No respiratory distress; chest expansion 

is symmetric, chest is bilaterally symmetric, rhonchi (scattered rhonchi)


Cardiovascular:  regular rate-rhythm; No JVD; S1 and S2


Gastrointestional:  soft, round, distended, hernia (umbilical), audible bowel 

sounds


Extremities:  other (mod bilat LE swelling)


Neurologic/Psychiatric:  grossly intact (moves all extremities)


Skin:  No rash on exposed areas, No ulcerations on exposed areas





Results/Procedures:


Labs


Laboratory Tests


10/1/21 15:51: Glucometer 225H


10/1/21 20:43: Glucometer 220H


10/2/21 05:42: 


White Blood Count 6.5, Red Blood Count 4.61, Hemoglobin 10.4L, Hematocrit 35L, 

Mean Corpuscular Volume 77L, Mean Corpuscular Hemoglobin 23L, Mean Corpuscular 

Hemoglobin Concent 29L, Red Cell Distribution Width 16.1H, Platelet Count 282, 

Mean Platelet Volume 11.0, Immature Granulocyte % (Auto) 1, Neutrophils (%) (

Auto) 51, Lymphocytes (%) (Auto) 30, Monocytes (%) (Auto) 12, Eosinophils (%) 

(Auto) 5, Basophils (%) (Auto) 1, Neutrophils # (Auto) 3.3, Lymphocytes # (Auto)

2.0, Monocytes # (Auto) 0.8, Eosinophils # (Auto) 0.4H, Basophils # (Auto) 0.0, 

Immature Granulocyte # (Auto) 0.1, Sodium Level 138, Potassium Level 4.0, 

Chloride Level 104, Carbon Dioxide Level 24, Anion Gap 10, Blood Urea Nitrogen 

11, Creatinine 0.86, Estimat Glomerular Filtration Rate 87, BUN/Creatinine Ratio

13, Glucose Level 172H, Calcium Level 9.3, Corrected Calcium 9.8, Magnesium 

Level 2.0, Total Bilirubin 0.5, Aspartate Amino Transf (AST/SGOT) 29, Alanine 

Aminotransferase (ALT/SGPT) 24, Alkaline Phosphatase 48, Total Protein 6.0L, 

Albumin 3.4


10/2/21 11:11: Glucometer 317H





Laboratory Tests


10/1/21 05:30








10/2/21 05:42











A/P:


Assessment:


Abdominal bloating of undetermined etiology


- Medical services managing





P. Atrial fibrillation  


- Eliquis for stroke prophylaxis


- Failed cardioversion by Dr. Stearns on 5-12-21


- YAQUELIN prior to cardioversion on 5-12-21 by Dr. Stearns showed dilated LA.  No left

atrial appendage clot seen.  LVEF 50%.  Mild MR.


- H/o cardioversion on 6-2-21 at [facility] by Dr. Gardner - currently SR on EKG of 

9-30-21





H/O multiple TIA's in the past





Coronary artery disease


- history of multiple interventions in the past, reporting that he had multiple 

stents done at [facility] in the remote past - details unknown


- Plavix and ASA





Abdominal aortic aneurysm


- workup showed that the aneurysm measured 5 cm in the infrarenal abdominal 

aorta, he has bilateral iliac aneurysm


- s/p AAA repair done by Dr. Rivera in April 2021


- Per CT of the abdomen on 9-30-21:  there is an endoleak. Due to the phase of 

contrast, the source of the leak is difficult to identify.





Hypertension


- controlled





Hyperlipidemia


- statin tx





Diabetes mellitus


-  followed and managed by primary care physician





History of sleep apnea


- was intolerant to C Pap machine





Obesity


- BMI 38, we discussed weight loss.





Tobaccoism


- stopped smoking 2 years ago





COPD


Plan:





Continue oral diuretics


Continue Eliquis d/t PAF. Stop ASA, but continue Plavix d/t known h/o CAD; 

having him on all 3 agents increases his risk of bleeding


Monitor lab closely


Replace electrolytes as indicated


Ok to d/c from cardiac standpoint











CLAUS DO MD [facility] CCDS    Oct 2, 2021 13:44

## 2021-10-02 NOTE — PROGRESS NOTE - HOSPITALIST
Subjective


HPI/CC On Admission


Date Seen by Provider:  Oct 2, 2021


Time Seen by Provider:  11:00


Chief complaint: Shortness of breath





History of present illness: This is a 76-year-old white male past medical 

history of atrial fibrillation who presents with recurrent congestive heart 

failure volume overload.  Currently he reports feeling much better.  

Echocardiogram ordered along with cardiology consult.  Pulmonary consult will 

also be added due to suspicion of untreated sleep apnea.  BMI 39.  Home meds 

will be restarted.  Hep-Lock and IV fluid.


Subjective/Events-last exam


Patient is much improved


Once albuterol neb treatments changed to inhaler which helps him bring up 

secretions


Flu shot completed today


Edema is improved


Oxygen supplementation maintained





Review of Systems


Pulmonary:  Dyspnea





Objective


Exam


Vital Signs





Vital Signs








  Date Time  Temp Pulse Resp B/P (MAP) Pulse Ox O2 Delivery O2 Flow Rate FiO2


 


10/3/21 03:31 36.4 73 20 113/50 (71) 92 Room Air  


 


10/2/21 19:57       0.00 


 


9/30/21 22:15        21





Capillary Refill : Less Than 3 Seconds


General Appearance:  No Apparent Distress, WD/WN, Chronically ill, Obese


Respiratory:  Normal Breath Sounds, No Accessory Muscle Use, No Respiratory 

Distress, Decreased Breath Sounds


Cardiovascular:  Regular Rate, Rhythm


Neurologic/Psychiatric:  Alert, Oriented x3, No Motor/Sensory Deficits, Normal 

Mood/Affect





Results/Procedures


Lab


Laboratory Tests


10/2/21 05:42








Patient resulted labs reviewed.





Assessment/Plan


Assessment and Plan


Assess & Plan/Chief Complaint


Assessment:


Acute volume overload


History of congestive heart failure


Atrial fibrillation


Hypertension


Diabetes mellitus


Suspicion for KENROY undiagnosed


Elevated BMI


Severe iron deficiency with microcytosis


Vitamin B12 deficiency





Plan:


Supportive care


Cardiology consult


Pulmonary consult


Home meds





10/2/2021:


Supportive care


Appreciate cardiology


Oxygen supplementation


Change albuterol to MDI 


IV iron


B12 supplement





Diagnosis/Problems


Diagnosis/Problems





(1) Volume overload


Status:  Acute


Qualifiers:  


   Hypervolemia type:  unspecified  Qualified Codes:  E87.70 - Fluid overload, 

unspecified


(2) Afib


(3) Orthopnea


Status:  Acute











MENDEL OLIVERA DO                 Oct 2, 2021 08:03

## 2021-10-03 VITALS — SYSTOLIC BLOOD PRESSURE: 113 MMHG | DIASTOLIC BLOOD PRESSURE: 50 MMHG

## 2021-10-03 VITALS — DIASTOLIC BLOOD PRESSURE: 82 MMHG | SYSTOLIC BLOOD PRESSURE: 121 MMHG

## 2021-10-03 VITALS — SYSTOLIC BLOOD PRESSURE: 106 MMHG | DIASTOLIC BLOOD PRESSURE: 64 MMHG

## 2021-10-03 VITALS — SYSTOLIC BLOOD PRESSURE: 131 MMHG | DIASTOLIC BLOOD PRESSURE: 81 MMHG

## 2021-10-03 VITALS — SYSTOLIC BLOOD PRESSURE: 139 MMHG | DIASTOLIC BLOOD PRESSURE: 65 MMHG

## 2021-10-03 LAB
ALBUMIN SERPL-MCNC: 3.6 GM/DL (ref 3.2–4.5)
ALP SERPL-CCNC: 58 U/L (ref 40–136)
ALT SERPL-CCNC: 28 U/L (ref 0–55)
BASOPHILS # BLD AUTO: 0.1 10^3/UL (ref 0–0.1)
BASOPHILS NFR BLD AUTO: 1 % (ref 0–10)
BILIRUB SERPL-MCNC: 0.8 MG/DL (ref 0.1–1)
BUN/CREAT SERPL: 11
CALCIUM SERPL-MCNC: 9.3 MG/DL (ref 8.5–10.1)
CHLORIDE SERPL-SCNC: 101 MMOL/L (ref 98–107)
CO2 SERPL-SCNC: 22 MMOL/L (ref 21–32)
CREAT SERPL-MCNC: 1.1 MG/DL (ref 0.6–1.3)
EOSINOPHIL # BLD AUTO: 0.4 10^3/UL (ref 0–0.3)
EOSINOPHIL NFR BLD AUTO: 5 % (ref 0–10)
GFR SERPLBLD BASED ON 1.73 SQ M-ARVRAT: 65 ML/MIN
GLUCOSE SERPL-MCNC: 186 MG/DL (ref 70–105)
HCT VFR BLD CALC: 36 % (ref 40–54)
HGB BLD-MCNC: 10.9 G/DL (ref 13.3–17.7)
LYMPHOCYTES # BLD AUTO: 1.8 10^3/UL (ref 1–4)
LYMPHOCYTES NFR BLD AUTO: 22 % (ref 12–44)
MANUAL DIFFERENTIAL PERFORMED BLD QL: NO
MCH RBC QN AUTO: 23 PG (ref 25–34)
MCHC RBC AUTO-ENTMCNC: 30 G/DL (ref 32–36)
MCV RBC AUTO: 76 FL (ref 80–99)
MONOCYTES # BLD AUTO: 0.9 10^3/UL (ref 0–1)
MONOCYTES NFR BLD AUTO: 11 % (ref 0–12)
NEUTROPHILS # BLD AUTO: 4.8 10^3/UL (ref 1.8–7.8)
NEUTROPHILS NFR BLD AUTO: 61 % (ref 42–75)
PLATELET # BLD: 296 10^3/UL (ref 130–400)
PMV BLD AUTO: 10.5 FL (ref 9–12.2)
POTASSIUM SERPL-SCNC: 4 MMOL/L (ref 3.6–5)
PROT SERPL-MCNC: 6.4 GM/DL (ref 6.4–8.2)
SODIUM SERPL-SCNC: 136 MMOL/L (ref 135–145)
WBC # BLD AUTO: 8 10^3/UL (ref 4.3–11)

## 2021-10-03 RX ADMIN — PANTOPRAZOLE SCH MG: 20 TABLET, DELAYED RELEASE ORAL at 06:22

## 2021-10-03 RX ADMIN — CYCLOBENZAPRINE HYDROCHLORIDE SCH MG: 10 TABLET, FILM COATED ORAL at 08:39

## 2021-10-03 RX ADMIN — ROSUVASTATIN CALCIUM SCH MG: 20 TABLET, FILM COATED ORAL at 08:39

## 2021-10-03 RX ADMIN — FENOFIBRATE SCH MG: 134 CAPSULE ORAL at 08:39

## 2021-10-03 RX ADMIN — FLUTICASONE PROPIONATE SCH SPRAY: 50 SPRAY, METERED NASAL at 08:46

## 2021-10-03 RX ADMIN — INSULIN ASPART SCH UNIT: 100 INJECTION, SOLUTION INTRAVENOUS; SUBCUTANEOUS at 12:19

## 2021-10-03 RX ADMIN — INSULIN ASPART SCH UNIT: 100 INJECTION, SOLUTION INTRAVENOUS; SUBCUTANEOUS at 08:41

## 2021-10-03 RX ADMIN — FUROSEMIDE SCH MG: 40 TABLET ORAL at 08:40

## 2021-10-03 RX ADMIN — INSULIN ASPART SCH UNIT: 100 INJECTION, SOLUTION INTRAVENOUS; SUBCUTANEOUS at 06:22

## 2021-10-03 RX ADMIN — METOPROLOL SUCCINATE SCH MG: 50 TABLET, EXTENDED RELEASE ORAL at 08:40

## 2021-10-03 RX ADMIN — Medication SCH ML: at 12:54

## 2021-10-03 RX ADMIN — APIXABAN SCH MG: 5 TABLET, FILM COATED ORAL at 08:39

## 2021-10-03 RX ADMIN — PREGABALIN SCH MG: 100 CAPSULE ORAL at 12:19

## 2021-10-03 RX ADMIN — POTASSIUM CHLORIDE SCH MEQ: 1500 TABLET, EXTENDED RELEASE ORAL at 06:22

## 2021-10-03 RX ADMIN — ALBUTEROL SULFATE SCH MG: 2.5 SOLUTION RESPIRATORY (INHALATION) at 10:33

## 2021-10-03 RX ADMIN — INSULIN ASPART SCH UNIT: 100 INJECTION, SOLUTION INTRAVENOUS; SUBCUTANEOUS at 20:31

## 2021-10-03 RX ADMIN — MONTELUKAST SCH MG: 10 TABLET, FILM COATED ORAL at 20:30

## 2021-10-03 RX ADMIN — ALBUTEROL SULFATE SCH MG: 2.5 SOLUTION RESPIRATORY (INHALATION) at 14:34

## 2021-10-03 RX ADMIN — DRONEDARONE SCH MG: 400 TABLET, FILM COATED ORAL at 08:39

## 2021-10-03 RX ADMIN — INSULIN ASPART SCH UNIT: 100 INJECTION, SOLUTION INTRAVENOUS; SUBCUTANEOUS at 18:18

## 2021-10-03 RX ADMIN — CYCLOBENZAPRINE HYDROCHLORIDE SCH MG: 10 TABLET, FILM COATED ORAL at 20:30

## 2021-10-03 RX ADMIN — DILTIAZEM HYDROCHLORIDE SCH MG: 120 CAPSULE, COATED, EXTENDED RELEASE ORAL at 08:39

## 2021-10-03 RX ADMIN — INSULIN ASPART SCH UNIT: 100 INJECTION, SOLUTION INTRAVENOUS; SUBCUTANEOUS at 16:12

## 2021-10-03 RX ADMIN — APIXABAN SCH MG: 5 TABLET, FILM COATED ORAL at 20:30

## 2021-10-03 RX ADMIN — CLOPIDOGREL BISULFATE SCH MG: 75 TABLET, FILM COATED ORAL at 08:39

## 2021-10-03 RX ADMIN — Medication SCH ML: at 06:12

## 2021-10-03 RX ADMIN — PREGABALIN SCH MG: 100 CAPSULE ORAL at 08:39

## 2021-10-03 RX ADMIN — GUAIFENESIN SCH MG: 600 TABLET, EXTENDED RELEASE ORAL at 20:30

## 2021-10-03 RX ADMIN — ALBUTEROL SULFATE SCH MG: 2.5 SOLUTION RESPIRATORY (INHALATION) at 20:52

## 2021-10-03 RX ADMIN — PREGABALIN SCH MG: 100 CAPSULE ORAL at 20:30

## 2021-10-03 RX ADMIN — DRONEDARONE SCH MG: 400 TABLET, FILM COATED ORAL at 20:30

## 2021-10-03 RX ADMIN — ALBUTEROL SULFATE SCH MG: 2.5 SOLUTION RESPIRATORY (INHALATION) at 06:53

## 2021-10-03 NOTE — PROGRESS NOTE - CARDIOLOGY
Cardiology SOAP Progress Note


Subjective:


Gen weakness and malaise present


Shortness of breath and feeling of abd distention have improved


No cp or palp or syncope


No n/v/d





Objective:


I&O/Vital Signs











 10/3/21 10/3/21 10/3/21 10/3/21





 06:54 07:00 07:54 08:15


 


Temp    36.6


 


Pulse  80  81


 


Resp    20


 


B/P (MAP)    121/82 (95)


 


Pulse Ox 96   95


 


O2 Delivery Room Air  Room Air Room Air


 


O2 Flow Rate 0.00   


 


    





 10/3/21 10/3/21 10/3/21 





 10:35 11:56 14:35 


 


Temp  36.7  


 


Pulse  86  


 


Resp  22  


 


B/P (MAP)  131/81 (98)  


 


Pulse Ox 94 96 95 


 


O2 Delivery Room Air Room Air Room Air 


 


O2 Flow Rate 0.00  0.00 














 10/3/21





 00:00


 


Intake Total 2080 ml


 


Output Total 1000 ml


 


Balance 1080 ml








Constitutional:  AAO x 3, well-developed, well-nourished


Respiratory:  No accessory muscle use, No respiratory distress; chest expansion 

is symmetric, chest is bilaterally symmetric, rhonchi (scattered rhonchi)


Cardiovascular:  regular rate-rhythm; No JVD; S1 and S2


Gastrointestional:  soft, round, distended, hernia (umbilical), audible bowel 

sounds


Extremities:  other (mod bilat LE swelling)


Neurologic/Psychiatric:  grossly intact (moves all extremities)


Skin:  No rash on exposed areas, No ulcerations on exposed areas





Results/Procedures:


Labs


Laboratory Tests


10/2/21 16:05: Glucometer 219H


10/2/21 20:06: Glucometer 130H


10/3/21 05:36: Glucometer 188H


10/3/21 05:45: 


White Blood Count 8.0, Red Blood Count 4.76, Hemoglobin 10.9L, Hematocrit 36L, 

Mean Corpuscular Volume 76L, Mean Corpuscular Hemoglobin 23L, Mean Corpuscular 

Hemoglobin Concent 30L, Red Cell Distribution Width 16.2H, Platelet Count 296, 

Mean Platelet Volume 10.5, Immature Granulocyte % (Auto) 1, Neutrophils (%) 

(Auto) 61, Lymphocytes (%) (Auto) 22, Monocytes (%) (Auto) 11, Eosinophils (%) 

(Auto) 5, Basophils (%) (Auto) 1, Neutrophils # (Auto) 4.8, Lymphocytes # (Auto)

1.8, Monocytes # (Auto) 0.9, Eosinophils # (Auto) 0.4H, Basophils # (Auto) 0.1, 

Immature Granulocyte # (Auto) 0.1, Sodium Level 136, Potassium Level 4.0, 

Chloride Level 101, Carbon Dioxide Level 22, Anion Gap 13, Blood Urea Nitrogen 

12, Creatinine 1.10, Estimat Glomerular Filtration Rate 65, BUN/Creatinine Ratio

11, Glucose Level 186H, Calcium Level 9.3, Corrected Calcium 9.6, Total 

Bilirubin 0.8, Aspartate Amino Transf (AST/SGOT) 36H, Alanine Aminotransferase 

(ALT/SGPT) 28, Alkaline Phosphatase 58, Total Protein 6.4, Albumin 3.6


10/3/21 11:07: Glucometer 280H





Laboratory Tests


10/2/21 05:42








10/3/21 05:45











A/P:


Assessment:


Abdominal bloating of undetermined etiology


- Medical services managing





P. Atrial fibrillation  


- Eliquis for stroke prophylaxis


- Failed cardioversion by Dr. Stearns on 5-12-21


- YAQUELIN prior to cardioversion on 5-12-21 by Dr. Stearns showed dilated LA.  No left

atrial appendage clot seen.  LVEF 50%.  Mild MR.


- H/o cardioversion on 6-2-21 at [facility] by Dr. Gardner - currently SR on EKG of 

9-30-21





H/O multiple TIA's in the past





Coronary artery disease


- history of multiple interventions in the past, reporting that he had multiple 

stents done at [facility] in the remote past - details unknown


- Plavix and ASA





Abdominal aortic aneurysm


- workup showed that the aneurysm measured 5 cm in the infrarenal abdominal 

aorta, he has bilateral iliac aneurysm


- s/p AAA repair done by Dr. Rivera in April 2021


- Per CT of the abdomen on 9-30-21:  there is an endoleak. Due to the phase of 

contrast, the source of the leak is difficult to identify.





Hypertension


- controlled





Hyperlipidemia


- statin tx





Diabetes mellitus


-  followed and managed by primary care physician





History of sleep apnea


- was intolerant to C Pap machine





Obesity


- BMI 38, we discussed weight loss.





Tobaccoism


- stopped smoking 2 years ago





COPD


Plan:





Continue oral diuretics


Continue Eliquis d/t PAF. Stop ASA, but continue Plavix d/t known h/o CAD; 

having him on all 3 agents increases his risk of bleeding


Monitor lab closely


Replace electrolytes as indicated


Ok to d/c from cardiac standpoint











CLAUS DO MD FACP [facility] CCDS    Oct 3, 2021 15:33

## 2021-10-03 NOTE — PROGRESS NOTE - HOSPITALIST
Subjective


HPI/CC On Admission


Date Seen by Provider:  Oct 3, 2021


Time Seen by Provider:  11:00


Chief complaint: Shortness of breath





History of present illness: This is a 76-year-old white male past medical 

history of atrial fibrillation who presents with recurrent congestive heart 

failure volume overload.  Currently he reports feeling much better.  

Echocardiogram ordered along with cardiology consult.  Pulmonary consult will 

also be added due to suspicion of untreated sleep apnea.  BMI 39.  Home meds 

will be restarted.  Hep-Lock and IV fluid.


Subjective/Events-last exam


Patient doing much better


Less shortness of breath


We will discontinue telemetry


Mucinex seems to be helping


PT and OT will be ordered


Overnight pulse ox tonight





Review of Systems


General:  Fatigue, Malaise


Pulmonary:  Dyspnea





Objective


Exam


Vital Signs





Vital Signs








  Date Time  Temp Pulse Resp B/P (MAP) Pulse Ox O2 Delivery O2 Flow Rate FiO2


 


10/4/21 03:24 37.2 76 18 121/61 (81) 90 Room Air  


 


10/3/21 14:35       0.00 


 


9/30/21 22:15        21





Capillary Refill : Less Than 3 Seconds


General Appearance:  No Apparent Distress, WD/WN, Chronically ill, Obese


Respiratory:  No Accessory Muscle Use, No Respiratory Distress, Decreased Breath

Sounds


Cardiovascular:  Regular Rate, Rhythm


Neurologic/Psychiatric:  Alert, Oriented x3





Results/Procedures


Lab


Laboratory Tests


10/3/21 05:45








Patient resulted labs reviewed.





Assessment/Plan


Assessment and Plan


Assess & Plan/Chief Complaint


Assessment:


Acute volume overload


History of congestive heart failure


Atrial fibrillation


Hypertension


Diabetes mellitus


Suspicion for KENROY undiagnosed


Elevated BMI


Severe iron deficiency with microcytosis


Vitamin B12 deficiency





Plan:


Supportive care


Cardiology consult


Pulmonary consult


Home meds





10/2/2021:


Supportive care


Appreciate cardiology


Oxygen supplementation


Change albuterol to MDI 


IV iron


B12 supplement





10/3/2021:


Iron supplement


Vitamin B12


Overnight O2 study





Diagnosis/Problems


Diagnosis/Problems





(1) Volume overload


Status:  Acute


Qualifiers:  


   Hypervolemia type:  unspecified  Qualified Codes:  E87.70 - Fluid overload, 

unspecified


(2) Afib


(3) Orthopnea


Status:  Acute











MENDEL OLIVERA DO                 Oct 3, 2021 08:02

## 2021-10-04 VITALS — DIASTOLIC BLOOD PRESSURE: 70 MMHG | SYSTOLIC BLOOD PRESSURE: 163 MMHG

## 2021-10-04 VITALS — DIASTOLIC BLOOD PRESSURE: 67 MMHG | SYSTOLIC BLOOD PRESSURE: 120 MMHG

## 2021-10-04 VITALS — DIASTOLIC BLOOD PRESSURE: 74 MMHG | SYSTOLIC BLOOD PRESSURE: 127 MMHG

## 2021-10-04 VITALS — SYSTOLIC BLOOD PRESSURE: 160 MMHG | DIASTOLIC BLOOD PRESSURE: 76 MMHG

## 2021-10-04 VITALS — DIASTOLIC BLOOD PRESSURE: 57 MMHG | SYSTOLIC BLOOD PRESSURE: 113 MMHG

## 2021-10-04 VITALS — SYSTOLIC BLOOD PRESSURE: 121 MMHG | DIASTOLIC BLOOD PRESSURE: 61 MMHG

## 2021-10-04 VITALS — DIASTOLIC BLOOD PRESSURE: 82 MMHG | SYSTOLIC BLOOD PRESSURE: 131 MMHG

## 2021-10-04 LAB
ALBUMIN SERPL-MCNC: 3.5 GM/DL (ref 3.2–4.5)
ALP SERPL-CCNC: 61 U/L (ref 40–136)
ALT SERPL-CCNC: 24 U/L (ref 0–55)
BASOPHILS # BLD AUTO: 0 10^3/UL (ref 0–0.1)
BASOPHILS NFR BLD AUTO: 1 % (ref 0–10)
BILIRUB SERPL-MCNC: 0.5 MG/DL (ref 0.1–1)
BUN/CREAT SERPL: 10
CALCIUM SERPL-MCNC: 9.4 MG/DL (ref 8.5–10.1)
CHLORIDE SERPL-SCNC: 102 MMOL/L (ref 98–107)
CO2 SERPL-SCNC: 22 MMOL/L (ref 21–32)
CREAT SERPL-MCNC: 1.28 MG/DL (ref 0.6–1.3)
EOSINOPHIL # BLD AUTO: 0.3 10^3/UL (ref 0–0.3)
EOSINOPHIL NFR BLD AUTO: 3 % (ref 0–10)
GFR SERPLBLD BASED ON 1.73 SQ M-ARVRAT: 55 ML/MIN
GLUCOSE SERPL-MCNC: 261 MG/DL (ref 70–105)
HCT VFR BLD CALC: 35 % (ref 40–54)
HGB BLD-MCNC: 10.6 G/DL (ref 13.3–17.7)
LYMPHOCYTES # BLD AUTO: 1.9 10^3/UL (ref 1–4)
LYMPHOCYTES NFR BLD AUTO: 24 % (ref 12–44)
MANUAL DIFFERENTIAL PERFORMED BLD QL: NO
MCH RBC QN AUTO: 23 PG (ref 25–34)
MCHC RBC AUTO-ENTMCNC: 30 G/DL (ref 32–36)
MCV RBC AUTO: 76 FL (ref 80–99)
MONOCYTES # BLD AUTO: 0.9 10^3/UL (ref 0–1)
MONOCYTES NFR BLD AUTO: 12 % (ref 0–12)
NEUTROPHILS # BLD AUTO: 4.7 10^3/UL (ref 1.8–7.8)
NEUTROPHILS NFR BLD AUTO: 60 % (ref 42–75)
PLATELET # BLD: 296 10^3/UL (ref 130–400)
PMV BLD AUTO: 10.9 FL (ref 9–12.2)
POTASSIUM SERPL-SCNC: 3.8 MMOL/L (ref 3.6–5)
PROT SERPL-MCNC: 6.4 GM/DL (ref 6.4–8.2)
SODIUM SERPL-SCNC: 135 MMOL/L (ref 135–145)
WBC # BLD AUTO: 7.8 10^3/UL (ref 4.3–11)

## 2021-10-04 RX ADMIN — INSULIN ASPART SCH UNIT: 100 INJECTION, SOLUTION INTRAVENOUS; SUBCUTANEOUS at 16:58

## 2021-10-04 RX ADMIN — FENOFIBRATE SCH MG: 134 CAPSULE ORAL at 09:38

## 2021-10-04 RX ADMIN — Medication SCH ML: at 00:30

## 2021-10-04 RX ADMIN — FLUTICASONE PROPIONATE SCH SPRAY: 50 SPRAY, METERED NASAL at 09:40

## 2021-10-04 RX ADMIN — CYCLOBENZAPRINE HYDROCHLORIDE SCH MG: 10 TABLET, FILM COATED ORAL at 09:37

## 2021-10-04 RX ADMIN — METOPROLOL SUCCINATE SCH MG: 50 TABLET, EXTENDED RELEASE ORAL at 09:37

## 2021-10-04 RX ADMIN — DILTIAZEM HYDROCHLORIDE SCH MG: 120 CAPSULE, COATED, EXTENDED RELEASE ORAL at 09:37

## 2021-10-04 RX ADMIN — INSULIN ASPART SCH UNIT: 100 INJECTION, SOLUTION INTRAVENOUS; SUBCUTANEOUS at 21:50

## 2021-10-04 RX ADMIN — SODIUM CHLORIDE SCH MLS/HR: 900 INJECTION INTRAVENOUS at 20:21

## 2021-10-04 RX ADMIN — ALBUTEROL SULFATE SCH MG: 2.5 SOLUTION RESPIRATORY (INHALATION) at 14:27

## 2021-10-04 RX ADMIN — CLOPIDOGREL BISULFATE SCH MG: 75 TABLET, FILM COATED ORAL at 09:37

## 2021-10-04 RX ADMIN — INSULIN ASPART SCH UNIT: 100 INJECTION, SOLUTION INTRAVENOUS; SUBCUTANEOUS at 09:22

## 2021-10-04 RX ADMIN — ALBUTEROL SULFATE SCH MG: 2.5 SOLUTION RESPIRATORY (INHALATION) at 11:24

## 2021-10-04 RX ADMIN — PANTOPRAZOLE SCH MG: 20 TABLET, DELAYED RELEASE ORAL at 06:17

## 2021-10-04 RX ADMIN — APIXABAN SCH MG: 5 TABLET, FILM COATED ORAL at 20:20

## 2021-10-04 RX ADMIN — INSULIN ASPART SCH UNIT: 100 INJECTION, SOLUTION INTRAVENOUS; SUBCUTANEOUS at 18:39

## 2021-10-04 RX ADMIN — Medication SCH ML: at 06:16

## 2021-10-04 RX ADMIN — PREGABALIN SCH MG: 100 CAPSULE ORAL at 09:38

## 2021-10-04 RX ADMIN — PREGABALIN SCH MG: 100 CAPSULE ORAL at 20:20

## 2021-10-04 RX ADMIN — INSULIN ASPART SCH UNIT: 100 INJECTION, SOLUTION INTRAVENOUS; SUBCUTANEOUS at 12:11

## 2021-10-04 RX ADMIN — ALBUTEROL SULFATE SCH MG: 2.5 SOLUTION RESPIRATORY (INHALATION) at 20:52

## 2021-10-04 RX ADMIN — PREGABALIN SCH MG: 100 CAPSULE ORAL at 12:11

## 2021-10-04 RX ADMIN — GUAIFENESIN SCH MG: 600 TABLET, EXTENDED RELEASE ORAL at 09:38

## 2021-10-04 RX ADMIN — ROSUVASTATIN CALCIUM SCH MG: 20 TABLET, FILM COATED ORAL at 09:37

## 2021-10-04 RX ADMIN — APIXABAN SCH MG: 5 TABLET, FILM COATED ORAL at 09:37

## 2021-10-04 RX ADMIN — CYCLOBENZAPRINE HYDROCHLORIDE SCH MG: 10 TABLET, FILM COATED ORAL at 20:20

## 2021-10-04 RX ADMIN — SODIUM CHLORIDE SCH MLS/HR: 900 INJECTION INTRAVENOUS at 09:30

## 2021-10-04 RX ADMIN — INSULIN ASPART SCH UNIT: 100 INJECTION, SOLUTION INTRAVENOUS; SUBCUTANEOUS at 06:17

## 2021-10-04 RX ADMIN — POTASSIUM CHLORIDE SCH MEQ: 1500 TABLET, EXTENDED RELEASE ORAL at 06:17

## 2021-10-04 RX ADMIN — MONTELUKAST SCH MG: 10 TABLET, FILM COATED ORAL at 20:20

## 2021-10-04 RX ADMIN — DRONEDARONE SCH MG: 400 TABLET, FILM COATED ORAL at 20:20

## 2021-10-04 RX ADMIN — DRONEDARONE SCH MG: 400 TABLET, FILM COATED ORAL at 09:38

## 2021-10-04 RX ADMIN — Medication SCH ML: at 16:55

## 2021-10-04 RX ADMIN — GUAIFENESIN SCH MG: 600 TABLET, EXTENDED RELEASE ORAL at 20:20

## 2021-10-04 RX ADMIN — INSULIN ASPART SCH UNIT: 100 INJECTION, SOLUTION INTRAVENOUS; SUBCUTANEOUS at 12:07

## 2021-10-04 RX ADMIN — ALBUTEROL SULFATE SCH MG: 2.5 SOLUTION RESPIRATORY (INHALATION) at 07:15

## 2021-10-04 RX ADMIN — FUROSEMIDE SCH MG: 40 TABLET ORAL at 09:37

## 2021-10-04 RX ADMIN — Medication SCH ML: at 22:16

## 2021-10-04 NOTE — DIAGNOSTIC IMAGING REPORT
INDICATION: Dyspnea.



Comparison made with prior examination of 09/30/2021.



FINDINGS: The heart size, mediastinal configuration, and

pulmonary vascularity are within normal limits. There is no

pleural effusion, pneumothorax, or pneumonia. The osseous

structures are unremarkable. 



IMPRESSION: No acute cardiopulmonary abnormality.



Dictated by: 



  Dictated on workstation # [id number]

## 2021-10-04 NOTE — PHYSICAL THERAPY EVALUATION
PT Evaluation-General


Medical Diagnosis


Admission Date


Sep 30, 2021 at 17:27


Medical Diagnosis:  Volume overload


Onset Date:  Sep 30, 2021





Therapy Diagnosis


Therapy Diagnosis:  debility/weakness





Precautions


Precautions/Isolations:  Fall Prevention, Standard Precautions





Referral


Physician:  Seb


Reason for Referral:  Evaluation/Treatment





Medical History


Pertinent Medical History:  Atrial Fib, CAD, COPD, DM, Heart Failure, HTN


Additional Medical History


AAA/morbid obesity


Current History


ER secondary to bloating/constipation


Reviewed History:  Yes





Social History


Home:  Single Level


Current Living Status:  Alone





Prior


Prior Level of Function


SCALE: Activities may be completed with or without assistive devices.





6-Indepedent-patient completes the activity by him/herself with no assistance 

from a helper.


5-Set-up or Clean-up Assistance-helper sets up or cleans up; patient completes 

activity. [place] assists only prior to or  


    following the activity.


4-Supervision or Touching Assistance-helper provides verbal cues and/or 

touching/steadying and/or contact guard assistance as patient completes 

activity. Assistance may be provided   


    throughout the activity or intermittently.


3-Partial/Moderate Assistance-helper does LESS THAN HALF the effort. [place] 

lifts, holds or supports trunk or limbs, but provides less than half the effort.


2-Substantial/Maximal Assistance-helper does MORE THAN HALF the effort. [place] 

lifts or holds trunk or limbs and provides more than half the effort.


5-Buggfuvcs-qxofsi does ALL the effort. Patient does none of the effort to 

complete the activity. Or, the assistance of 2 or more helpers is required for 

the patient to complete the  


    activity.


If activity was not attempted, code reason:


7-Patient Refused.


9-Not Applicable-not attempted and the patient did not perform the activity 

before the current illness, exacerbation or injury.


10-Not Attempted due to Environmental Limitations-(lack of equipment, weather 

restraints, etc.).


88-Not Attempted due to Medical Conditions or Safety Concerns.


Bed Mobility:  6


Transfers (B,C,W/C):  6


Gait:  6


Stairs:  6


Indoor Mobility (Ambulation):  Independent


Stairs:  Independent


Prior Devices Use:  None





PT Evaluation-Current


Subjective


Patient agrees to PT.





Objective


Patient Orientation:  Normal For Age





ROM/Strength


ROM Lower Extremities


bilateral LE WFL


Strength Lower Extremities


4/5 grossly bilateral LE





Integumentary/Posture


Bowel Incontinence:  No


Bladder Incontinence:  No


Posture


WFL





Neuromuscular


(Tone, Coordination, Reflexes)


grossly intact





Sensory


Vision:  Wears Glasses


Hearing:  Functional





Transfers


Sit to Lying (QC):  6


Lying to Sitting/Side of Bed(Q:  6


Sit to Stand (QC):  6





Gait


Does the Patient Walk?:  Yes


Mode of Locomotion:  Walk


Anticipated Mode of Locomotion:  Walk


Walk 10 feet (QC):  6


Walk 50 ft with 2 Turns(QC):  6


Walk 150 ft (QC):  6


Distance:  200'


Gait Assistive Device:  None


Comments/Gait Description


safe and functional with no deviation





Balance


Sitting Static:  Normal


Sitting Dynamic:  Normal


Standing Static:  Normal


 Standing Dynamic:  Normal


Picking up an Object (QC):  6





Treatment


SAO2 92% RA with activity





Assessment/Needs


76 y.o. male, is currently at [facility] with all gross motor skills and 

does not require skilled PT intervention.


Rehab Potential:  Fair





PT Plan


Treatment/Plan


Treatment Plan:  Discontinue PT, goals met


Treatment Duration:  Oct 4, 2021


Frequency:  1 time per week


Estimated Hrs Per Day:  .25 hour per day


Patient and/or Family Agrees t:  Yes





Time/GCodes


Time In:  954


Time Out:  1010


Total Billed Treatment Time:  16


Total Billed Treatment


1 visit


EVModC 16 min











MOSHE HARKINS PT               Oct 4, 2021 10:49 Low Suspicion of COVID-19

## 2021-10-04 NOTE — OCC THERAPY PROGRESS NOTE
Therapy Progress Note


OT evaluation received and chart reviewed. OT visited with pt who indicates he 

is at his PLOF with ADLs. He has been up to the bathroom independently, and was 

IND with functional mobility with ' no AD. Pt states no concerns with 

completing ADLS upon discharge. No skilled OT services indicated at this time. 

D/C from OT, as pt is IND with ADLs and at PLOF.





1, visit


1135











JD BENJAMIN OT           Oct 4, 2021 11:39

## 2021-10-04 NOTE — PROGRESS NOTE - HOSPITALIST
Subjective


HPI/CC On Admission


Date Seen by Provider:  Oct 4, 2021


Time Seen by Provider:  11:00


Chief complaint: Shortness of breath





History of present illness: This is a 76-year-old white male past medical 

history of atrial fibrillation who presents with recurrent congestive heart 

failure volume overload.  Currently he reports feeling much better.  

Echocardiogram ordered along with cardiology consult.  Pulmonary consult will 

also be added due to suspicion of untreated sleep apnea.  BMI 39.  Home meds 

will be restarted.  Hep-Lock and IV fluid.


Subjective/Events-last exam


Pt doing okay


Ambulatory 


Wheezing and SOB during activity


Pulmonary evaluated him to have pulmonary source of SOB so nebulizer treatments 

ordered


Doxycycline ordered, 100mg IV twice daily


Conferred with Dr. Stearns





Review of Systems


General:  Fatigue, Malaise





Objective


Exam


Vital Signs





Vital Signs








  Date Time  Temp Pulse Resp B/P (MAP) Pulse Ox O2 Delivery O2 Flow Rate FiO2


 


10/5/21 04:16 36.8 85 18 115/60 (78) 93 Nasal Cannula 2.00 


 


9/30/21 22:15        21





Capillary Refill : Less Than 3 Seconds


General Appearance:  No Apparent Distress, WD/WN, Chronically ill, Obese


Respiratory:  No Accessory Muscle Use, No Respiratory Distress, Decreased Breath

Sounds, Rales, Wheezing


Cardiovascular:  Regular Rate, Rhythm


Neurologic/Psychiatric:  Alert, Oriented x3, No Motor/Sensory Deficits, Normal 

Mood/Affect





Results/Procedures


Lab


Laboratory Tests


10/4/21 05:15








Patient resulted labs reviewed.





Assessment/Plan


Assessment and Plan


Assess & Plan/Chief Complaint


Assessment:


Acute volume overload


Acute bronchitis with suspicion of obesity hypoventilation syndrome


History of congestive heart failure


Atrial fibrillation


Hypertension


Diabetes mellitus


Suspicion for KENROY undiagnosed


Elevated BMI


Severe iron deficiency with microcytosis


Vitamin B12 deficiency





Plan:


Supportive care


Cardiology consult


Pulmonary consult


Home meds





10/2/2021:


Supportive care


Appreciate cardiology


Oxygen supplementation


Change albuterol to MDI 


IV iron


B12 supplement





10/3/2021:


Iron supplement


Vitamin B12


Overnight O2 study





10/4/2021:


Appreciate pulmonary


Appreciate cardiology


IV antibiotics





Diagnosis/Problems


Diagnosis/Problems





(1) Volume overload


Status:  Acute


Qualifiers:  


   Hypervolemia type:  unspecified  Qualified Codes:  E87.70 - Fluid overload, 

unspecified


(2) Afib


(3) Orthopnea


Status:  Acute











MENDEL OLIVERA DO                 Oct 4, 2021 09:41

## 2021-10-04 NOTE — CARDIOLOGY PROGRESS NOTE
Subjective


Date Seen by Provider:  Oct 4, 2021


Time Seen by Provider:  08:51


Subjective/Events-last exam


Patient is sitting up in bed, c/o dyspnea and nonproductive cough. Denies any 

chest pain


Review of Systems


General:  No Chills, No Night Sweats; Fatigue; No Malaise, No Appetite, No Other


HEENT:  No Head Aches, No Visual Changes, No Eye Pain, No Ear Pain, No Dysphasi

a, No Sinus Congestion, No Post Nasal Drip, No Sore Throat, No Other


Pulmonary:  Dyspnea; No Cough, No Pleuritic Chest Pain, No Other


Cardiovascular:  Edema; No: Chest Pain, Palpitations, Orthopnea, Paroxysmal Noc.

Dyspnea, Lt Headedness, Other





Objective-Cardiology


Exam


Last Set of Vital Signs





Vital Signs








 9/30/21 10/4/21 10/4/21





 22:15 08:00 11:44


 


Temp   36.1


 


Pulse   89


 


Resp   24


 


B/P (MAP)   131/82 (98)


 


Pulse Ox   93


 


O2 Delivery   Room Air


 


O2 Flow Rate  0.00 


 


FiO2 21  








I&O











Intake and Output 


 


 10/4/21





 00:00


 


Intake Total 4883.25 ml


 


Output Total 6340 ml


 


Balance -1456.75 ml


 


 


 


Intake Oral 4607 ml


 


IV Total 276.25 ml


 


Output Urine Total 6340 ml


 


# Voids 1








General:  Alert, Oriented X3, Cooperative


HEENT:  Atraumatic, PERRLA


Neck:  Supple, No JVD


Lungs:  Other (bilateral rhonchi, wheezing )


Heart:  Regular Rate, Normal S1, Normal S2


Abdomen:  Normal Bowel Sounds


Extremities:  No Clubbing, No Cyanosis, Other (+1 edema BLE)


Skin:  No Rashes, No Breakdown


Neuro:  Normal Gait, Normal Speech


Psych/Mental Status:  Mental Status NL, Mood NL





Results


Lab


Laboratory Tests


10/4/21 05:15














A/P-Cardiology


Admission Diagnosis


PAF


Dyspnea 


HTP


HTN





Assessment/Plan


Increased dyspnea, nonproductive cough, having wheezing and rhonchi on physical 

exam, questionable bronchitis vs pneumonia, I will evaluate CXR. Recommend 

starting antibiotic for possible bronchitis





Paroxysmal atrial fibrillation, h/o failed cardioversion May 2021, had another 

cardioversion on 6/2/21 by Dr. Gardner at [facility], currently SR. Maintained on 

Eliquis, Cardizem CD, Multaq. 





[id number] score of 6, yearly risk of stroke without oral anticoagulation is 

over 6 percent, Maintained on Eliquis.





Cor pulmonale, 2D Echo done 10/1/21 showing EF 55-60%, PA 20-25mmHg, I believe 

it to be an underestimation of the PA pressure. Continue to diurese.





Coronary artery disease, history of multiple interventions in the past, 

reporting that he had multiple stents done at [facility] in the remote past 

and he had under deployed stent. Currently asymptomatic. Continue to monitor, 

planning to evaluate stress test as an outpatient. 





Abdominal aortic aneurysm, workup showed that the aneurysm measured 5 cm in the 

infrarenal abdominal aorta, he has bilateral iliac aneurysm, s/p AAA repair done

by Dr. Rivera in April 2021. CT abdomen and pelvis done 9/30/21 showing there is

an endoleak. Due to the phase of contrast, the source of the leak is difficult 

to identify.





Hypertension, controlled, continue to monitor.





Hyperlipidemia, hypertriglyceridemia, maintained on fenofibrate and 

rosuvastatin, continue to monitor as outpatient. 





Diabetes mellitus, followed and managed by primary care physician





COPD/KENROY, was intolerant to C Pap machine





History of multiple TIAs/CVA, maintained on Plavix and Eliquis





Obesity, BMI 38, we discussed weight loss.





Tobaccoism, stopped smoking 2 years ago, encouraged to continue with smoking 

cessation.























Supervisory-Addendum Brief


Supervisory Addendum


Participated in pt care:  history, MDM, physical


Personally performed:  exam, history, MDM


Care discussed with:  PA


Results interpretation:  Verified all documentation


Notes:


Patient was seen and evaluated with Isaura, examination performed, management 

plan was discussed, agree with the current scribed note, I made few changes to 

the note using Italic font.


Patient was seen at bedside sitting comfortably


Complaining of cough and mild shortness of breath


Mild pedal edema


We will give 1 dose of additional Lasix IV


Chest x-ray was reviewed showing no acute infiltrate


Probably acute exacerbation of COPD with bronchitis.  Recommending empiric 

antibiotic


Management was discussed with Dr. Seb HUYNH,ISAURA JIMENEZ   Oct 4, 2021 08:41


HIALEY ZEE MD               Oct 4, 2021 13:18

## 2021-10-04 NOTE — PULMONARY CONSULTATION
History of Present Illness


History of Present Illness


Date Seen by Provider:  Oct 4, 2021


Time Seen by Provider:  11:00


Date of Admission


Patient acknowledged, consented, and participated in this virtual visit which 

was conducted using real time audio/video. 


Thank you for asking us to see this patient for respiratory insufficiency and 

distress. 


HPC: Recent events: Now c/o increased wheeze, SOB.


PMH: COPD, CHF, DM, Afib, HTN., CAD.


SH: smoking history Y


FH: Non-contributory 


ROS:as in HPI.


PE: Mild distress. VSS RR O2 sat 92-93% on RA


HEENT: No obvious masses, adenopathy or JVD. 


Chest: wheezes and coarse breath sounds. 


CV:  S1 S2 No murmur or added sounds. 


Abd: Non-tender. Bowel sounds . 


: Unremarkable. Charles N. 


CNS/psychiatric: Alert and oriented, grossly intact. No obvious focal findings. 


Extremities: 1-2+ edema. Capillary refill < 3 seconds. 


Skin: unremarkable. 


Results: Elevated . Decreased Hb 10.6.  CXR hyperinflated w clear fields. 


A/P: Respiratory insufficiency/distress: Cont medrol and Lasix. Add Duonebs qid 

and PRN q2 hours.


Available chart/ vitals / labs /images reviewed.


Video assessment done using teleICU camera, rest of exam as per RN.


Monitor for increasing oxygenation needs and/or need for ICU transfer. 


Discussed with RN Magno and Dr. Grigsby. Asked RN to reach out to eICU if any 

questions or concerns later. Time spent with patient/coordination of care with 

other health professionals (mins):  24





Allergies and Home Medications


Allergies


Coded Allergies:  


     lisinopril (Verified  Allergy, Mild, 5/12/21)


   MUSCLE ACHES


     losartan (Verified  Allergy, Mild, Rash, 5/12/21)


     lovastatin (Verified  Allergy, Mild, 5/12/21)


   MUSCLE ACHES


     rosiglitazone (Verified  Allergy, Mild, Hives, 5/12/21)


     umeclidinium (Verified  Allergy, Mild, Rash, 5/12/21)





Home Medications


Albuterol Sulfate 1 Puff Puff, 2 PUFF IH Q4H PRN for SHORTNESS OF BREATH, 

(Reported)


Apixaban 5 Mg Tablet, 5 MG PO BID, (Reported)


Clopidogrel Bisulfate 75 Mg Tablet, 75 MG PO DAILY, (Reported)


Diltiazem HCl 120 Mg Cap.er.24h, 120 MG PO DAILY, (Reported)


Dronedarone HCl 400 Mg Tablet, 400 MG PO BID, (Reported)


Fenofibrate Nanocrystallized 145 Mg Tablet, 145 MG PO DAILY, (Reported)


Fluticasone Propionate 16 Gm Spray.susp, 2 SPRAYS NSEACH DAILY, (Reported)


Furosemide 40 Mg Tablet, 40 MG PO BID PRN for FLUID RETENTION, (Reported)


Hydrochlorothiazide 12.5 Mg Tablet, 12.5 MG PO DAILY PRN for FLUID RETENTION, 

(Reported)


Hydrocodone/Acetaminophen 1 Each Tablet, 1 EA PO Q6H PRN for PAIN-MODERATE (5-

7), (Reported)


Insulin Aspart 300 Units/3 Ml Solution, 25 UNITS SQ TIDWM, (Reported)


Insulin Glargine,Hum.rec.anlog 100 Unit/1 Ml Insuln.pen, 60 UNIT SQ BID, 

(Reported)


Meloxicam 15 Mg Tablet, 15 MG PO DAILY, (Reported)


Metoprolol Succinate 50 Mg Tab.er.24h, 50 MG PO DAILY, (Reported)


Montelukast Sodium 10 Mg Tablet, 10 MG PO HS, (Reported)


Omeprazole 20 Mg Capsule.dr, 20 MG PO DAILY, (Reported)


Pregabalin 100 Mg Capsule, 100 MG PO TID, (Reported)


Rosuvastatin Calcium 40 Mg Tablet, 40 MG PO DAILY, (Reported)


Spironolactone 25 Mg Tablet, 25 MG PO DAILY PRN for FLUID RETENTION, (Reported)





Past Medical/Social/Family Hx


Patient Social History


Marrital Status:  single


Employed/Student:  retired


Tobacco Use?:  No


Smoking Status:  Former Smoker


Smokeless type used:  Chew


Smokeless Tobacco Frequency:  Light User


Use of E-Cig and/or Vaping dev:  No


Substance use?:  No


Alcohol Use?:  Yes


Alcohol type:  Hard Liquor


Alcohol Frequency:  Rarely


Pt stated abuse/neglect:  No





Immunizations Up To Date


Influenza Vaccine Up-to-Date:  No; Not Current


First/Initial COVID19 Vaccinat:  APRIL 2021


Second COVID19 Vaccination Alberto:  MAY 2021


Tetanus Booster (TDap):  More Than 5 Years


Hepatitis A:  No


Hepatitis B:  No


TB Skin Test:  None





Current Status


Advance Directives:  No


Communicates:  Verbally


Primary Language:  English


Preferred Spoken Language:  English


Is interpretation needed?:  Yes


Sensory deficits:  Vision impairment, Hearing impairment


Implanted or Applied Medical D:  Stents





Review of Systems


Constitutional:  see HPI


EENTM:  see HPI


Respiratory:  see HPI


Gastrointestinal:  see HPI


Genitourinary:  see HPI


Musculoskeletal:  see HPI


Skin:  see HPI


Psychiatric/Neurological:  See HPI


All Other Systems Reviewed


Negative Unless Noted:  Yes





Sepsis Event


Evaluation


Height, Weight, BMI


Height: '"


Weight: lbs. oz. kg; 39.12 BMI


Method:





Exam


Exam


Patient acknowledged, consented, and participated in this virtual visit which 

was conducted using real time audio/video


Vital Signs








  Date Time  Temp Pulse Resp B/P (MAP) Pulse Ox O2 Delivery O2 Flow Rate FiO2


 


10/4/21 07:20     96 Room Air  


 


10/4/21 07:16 35.9 77 24 163/70 (101) 96 Room Air  


 


10/4/21 03:24 37.2 76 18 121/61 (81) 90 Room Air  


 


10/4/21 00:17 36.8 83 20 113/57 (75) 91 Room Air  


 


10/3/21 20:30      Room Air  


 


10/3/21 20:00 36.2 67 22 139/65 (89) 90 Room Air  


 


10/3/21 15:30 36.7 76 20 106/64 (78) 92 Room Air  


 


10/3/21 14:35     95 Room Air 0.00 


 


10/3/21 11:56 36.7 86 22 131/81 (98) 96 Room Air  














I & O 


 


 10/4/21





 07:00


 


Intake Total 4483.25 ml


 


Output Total 6440 ml


 


Balance -1956.75 ml








Height & Weight


Height: '"


Weight: lbs. oz. kg; 39.12 BMI


Method:


General Appearance:  No Apparent Distress, WD/WN, Chronically ill, Obese


HEENT:  PERRL/EOMI, Normal ENT Inspection, Pharynx Normal, Moist Mucous Membran

es


Neck:  Full Range of Motion, Normal Inspection, Non Tender


Respiratory:  No Accessory Muscle Use, No Respiratory Distress, Decreased Breath

Sounds


Cardiovascular:  Regular Rate, Rhythm


Capillary Refill:  Less Than 3 Seconds


Peripheral Pulses:  1+ Dorsalis Pedis (R), 1+ Left Dors-Pedis (L)


Gastrointestinal:  normal bowel sounds, soft; No distended, No guarding, No 

rebound; tenderness


Extremity:  Normal Capillary Refill, Normal Inspection, Normal Range of Motion, 

Non Tender, No Calf Tenderness, No Pedal Edema


Neurologic/Psychiatric:  Alert, Oriented x3


Skin:  Normal Color, Warm/Dry


Lymphatic:  No Adenopathy





Results


Lab


Laboratory Tests


10/3/21 05:45








10/4/21 05:15











Assessment/Plan


Assessment/Plan


See free text


Critical Care:  Critically Ill Patient











CHANNING MORALES MD           Oct 4, 2021 11:21
37

## 2021-10-05 VITALS — DIASTOLIC BLOOD PRESSURE: 68 MMHG | SYSTOLIC BLOOD PRESSURE: 125 MMHG

## 2021-10-05 VITALS — DIASTOLIC BLOOD PRESSURE: 60 MMHG | SYSTOLIC BLOOD PRESSURE: 115 MMHG

## 2021-10-05 LAB
ALBUMIN SERPL-MCNC: 3.5 GM/DL (ref 3.2–4.5)
ALP SERPL-CCNC: 60 U/L (ref 40–136)
ALT SERPL-CCNC: 23 U/L (ref 0–55)
ARTERIAL PATENCY WRIST A: (no result)
BASE EXCESS STD BLDA CALC-SCNC: 2.1 MMOL/L (ref -2.5–2.5)
BASOPHILS # BLD AUTO: 0.1 10^3/UL (ref 0–0.1)
BASOPHILS NFR BLD AUTO: 0 % (ref 0–10)
BDY SITE: (no result)
BILIRUB SERPL-MCNC: 0.4 MG/DL (ref 0.1–1)
BODY TEMPERATURE: 37
BUN/CREAT SERPL: 12
CALCIUM SERPL-MCNC: 9.3 MG/DL (ref 8.5–10.1)
CHLORIDE SERPL-SCNC: 101 MMOL/L (ref 98–107)
CO2 BLDA CALC-SCNC: 27.5 MMOL/L (ref 21–31)
CO2 SERPL-SCNC: 23 MMOL/L (ref 21–32)
CREAT SERPL-MCNC: 1.18 MG/DL (ref 0.6–1.3)
EOSINOPHIL # BLD AUTO: 0 10^3/UL (ref 0–0.3)
EOSINOPHIL NFR BLD AUTO: 0 % (ref 0–10)
GFR SERPLBLD BASED ON 1.73 SQ M-ARVRAT: 60 ML/MIN
GLUCOSE SERPL-MCNC: 271 MG/DL (ref 70–105)
HCT VFR BLD CALC: 34 % (ref 40–54)
HGB BLD-MCNC: 10.4 G/DL (ref 13.3–17.7)
INHALED O2 FLOW RATE: (no result) L/MIN
LYMPHOCYTES # BLD AUTO: 2.2 10^3/UL (ref 1–4)
LYMPHOCYTES NFR BLD AUTO: 17 % (ref 12–44)
MANUAL DIFFERENTIAL PERFORMED BLD QL: NO
MCH RBC QN AUTO: 23 PG (ref 25–34)
MCHC RBC AUTO-ENTMCNC: 30 G/DL (ref 32–36)
MCV RBC AUTO: 76 FL (ref 80–99)
MONOCYTES # BLD AUTO: 1.3 10^3/UL (ref 0–1)
MONOCYTES NFR BLD AUTO: 10 % (ref 0–12)
NEUTROPHILS # BLD AUTO: 9.6 10^3/UL (ref 1.8–7.8)
NEUTROPHILS NFR BLD AUTO: 72 % (ref 42–75)
PCO2 BLDA: 42 MMHG (ref 35–45)
PH BLDA: 7.42 [PH] (ref 7.37–7.43)
PLATELET # BLD: 326 10^3/UL (ref 130–400)
PMV BLD AUTO: 10.8 FL (ref 9–12.2)
PO2 BLDA: 87 MMHG (ref 79–93)
POTASSIUM SERPL-SCNC: 3.8 MMOL/L (ref 3.6–5)
PROT SERPL-MCNC: 6.5 GM/DL (ref 6.4–8.2)
SAO2 % BLDA FROM PO2: 97 % (ref 94–100)
SODIUM SERPL-SCNC: 135 MMOL/L (ref 135–145)
VENTILATION MODE VENT: NO
WBC # BLD AUTO: 13.3 10^3/UL (ref 4.3–11)

## 2021-10-05 RX ADMIN — FENOFIBRATE SCH MG: 134 CAPSULE ORAL at 08:44

## 2021-10-05 RX ADMIN — FUROSEMIDE SCH MG: 40 TABLET ORAL at 08:43

## 2021-10-05 RX ADMIN — ALBUTEROL SULFATE SCH MG: 2.5 SOLUTION RESPIRATORY (INHALATION) at 10:47

## 2021-10-05 RX ADMIN — DILTIAZEM HYDROCHLORIDE SCH MG: 120 CAPSULE, COATED, EXTENDED RELEASE ORAL at 08:44

## 2021-10-05 RX ADMIN — ROSUVASTATIN CALCIUM SCH MG: 20 TABLET, FILM COATED ORAL at 08:44

## 2021-10-05 RX ADMIN — SODIUM CHLORIDE SCH MLS/HR: 900 INJECTION INTRAVENOUS at 09:03

## 2021-10-05 RX ADMIN — CYCLOBENZAPRINE HYDROCHLORIDE SCH MG: 10 TABLET, FILM COATED ORAL at 08:43

## 2021-10-05 RX ADMIN — DRONEDARONE SCH MG: 400 TABLET, FILM COATED ORAL at 08:43

## 2021-10-05 RX ADMIN — CLOPIDOGREL BISULFATE SCH MG: 75 TABLET, FILM COATED ORAL at 08:44

## 2021-10-05 RX ADMIN — Medication SCH ML: at 05:39

## 2021-10-05 RX ADMIN — Medication SCH ML: at 12:57

## 2021-10-05 RX ADMIN — PREGABALIN SCH MG: 100 CAPSULE ORAL at 08:44

## 2021-10-05 RX ADMIN — APIXABAN SCH MG: 5 TABLET, FILM COATED ORAL at 08:44

## 2021-10-05 RX ADMIN — POTASSIUM CHLORIDE SCH MEQ: 1500 TABLET, EXTENDED RELEASE ORAL at 06:11

## 2021-10-05 RX ADMIN — GUAIFENESIN SCH MG: 600 TABLET, EXTENDED RELEASE ORAL at 08:44

## 2021-10-05 RX ADMIN — METOPROLOL SUCCINATE SCH MG: 50 TABLET, EXTENDED RELEASE ORAL at 08:43

## 2021-10-05 RX ADMIN — INSULIN ASPART SCH UNIT: 100 INJECTION, SOLUTION INTRAVENOUS; SUBCUTANEOUS at 11:37

## 2021-10-05 RX ADMIN — PREGABALIN SCH MG: 100 CAPSULE ORAL at 12:57

## 2021-10-05 RX ADMIN — INSULIN ASPART SCH UNIT: 100 INJECTION, SOLUTION INTRAVENOUS; SUBCUTANEOUS at 08:44

## 2021-10-05 RX ADMIN — ALBUTEROL SULFATE SCH MG: 2.5 SOLUTION RESPIRATORY (INHALATION) at 06:53

## 2021-10-05 RX ADMIN — FLUTICASONE PROPIONATE SCH SPRAY: 50 SPRAY, METERED NASAL at 08:48

## 2021-10-05 RX ADMIN — INSULIN ASPART SCH UNIT: 100 INJECTION, SOLUTION INTRAVENOUS; SUBCUTANEOUS at 06:11

## 2021-10-05 RX ADMIN — INSULIN ASPART SCH UNIT: 100 INJECTION, SOLUTION INTRAVENOUS; SUBCUTANEOUS at 12:57

## 2021-10-05 RX ADMIN — PANTOPRAZOLE SCH MG: 20 TABLET, DELAYED RELEASE ORAL at 06:11

## 2021-10-05 NOTE — DISCHARGE SUMMARY
Discharge Summary


Hospital Course


Was the Problem List Reviewed?:  Yes


Problems/Dx:  


(1) Volume overload


Status:  Acute


Qualifiers:  


   Qualified Codes:  E87.70 - Fluid overload, unspecified


(2) Afib


(3) Orthopnea


Status:  Acute


(4) Dependence on supplemental oxygen


(5) Bronchitis


(6) COPD exacerbation


Hospital Course


Date of Admission: Oct 4, 2021 at 12:03 


Admission Diagnosis :  





Family Physician/Provider: Luis Mcpherson MD  





Date of Discharge: 10/5/21 


Discharge Diagnosis: Respiratory insufficiency, volume overload, acute 

exacerbation of COPD, acute bronchitis placed on doxycycline, new O2 dependence








Hospital Course:


Hospital course: 


pt had a lengthy hospital course, he was admitted for exacerbation of CHF and 

respiratory failure with untreated KENROY, given rise to all these medical issues. 

Pulmonology consulted, agreed with Doxycycline antibiotic and steroids, 

cardiology initiated diuretics. Pt was discharged on two liters of oxygen with 

close followup with Dr. Mcpherson. 














Labs and Pending Lab Test:


Laboratory Tests


10/4/21 11:03: Glucometer 187H


10/4/21 15:16: Glucometer 250H


10/4/21 20:09: Glucometer 304H


10/5/21 05:25: 


Glucometer 238H, Blood Gas Puncture Site RIGHT RADIAL, Blood Gas Patient 

Temperature 37.0, Arterial Blood pH 7.42, Arterial Blood Partial Pressure CO2 

42, Arterial Blood Partial Pressure O2 87, Arterial Blood HCO3 26, Arterial 

Blood Total CO2 27.5, Arterial Blood Oxygen Saturation 97, Arterial Blood Base 

Excess 2.1, Duc Test YES-POS, Blood Gas Ventilator Setting NO, Blood Gas 

Inspired Oxygen 2L


10/5/21 05:40: 


White Blood Count 13.3H, Red Blood Count 4.55, Hemoglobin 10.4L, Hematocrit 34L,

Mean Corpuscular Volume 76L, Mean Corpuscular Hemoglobin 23L, Mean Corpuscular 

Hemoglobin Concent 30L, Red Cell Distribution Width 16.8H, Platelet Count 326, 

Mean Platelet Volume 10.8, Immature Granulocyte % (Auto) 1, Neutrophils (%) 

(Auto) 72, Lymphocytes (%) (Auto) 17, Monocytes (%) (Auto) 10, Eosinophils (%) 

(Auto) 0, Basophils (%) (Auto) 0, Neutrophils # (Auto) 9.6H, Lymphocytes # 

(Auto) 2.2, Monocytes # (Auto) 1.3H, Eosinophils # (Auto) 0.0, Basophils # 

(Auto) 0.1, Immature Granulocyte # (Auto) 0.2H, Sodium Level 135, Potassium 

Level 3.8, Chloride Level 101, Carbon Dioxide Level 23, Anion Gap 11, Blood Urea

Nitrogen 14, Creatinine 1.18, Estimat Glomerular Filtration Rate 60, BUN/

Creatinine Ratio 12, Glucose Level 271H, Calcium Level 9.3, Corrected Calcium 

9.7, Total Bilirubin 0.4, Aspartate Amino Transf (AST/SGOT) 28, Alanine 

Aminotransferase (ALT/SGPT) 23, Alkaline Phosphatase 60, Total Protein 6.5, 

Albumin 3.5





Home Meds


Active


Potassium Chloride 10 Meq Tab.er.prt 10 Meq PO DAILY


Prednisone 10 Mg Tab.ds.pk 10 Mg PO DAILY


     Take 4 tabs(40mg)daily,decrease by 1 tab(10MG)daily.


Doxycycline Hyclate 100 Mg Tablet 100 Mg PO BID


Mucinex (Guaifenesin) 600 Mg Tab.er.12h 600 Mg PO BID


Fluticasone-Salmeterol 113-14 (Fluticasone/Salmeterol) 1 Each Aer.pow.ba 0 Each 

IH RTBID


Furosemide 40 Mg Tablet 40 Mg PO DAILY


Cyclobenzaprine HCl 10 Mg Tablet 10 Mg PO BID


Reported


Spironolactone 25 Mg Tablet 25 Mg PO DAILY PRN


Hydrocodone-Acetamin 7.5-325 (Hydrocodone/Acetaminophen) 1 Each Tablet 1 Ea PO 

Q6H PRN


Multaq (Dronedarone HCl) 400 Mg Tablet 400 Mg PO BID


Diltiazem 24Hr ER (Diltiazem HCl) 120 Mg Cap.er.24h 120 Mg PO DAILY


Fluticasone Propionate 16 Gm Spray.susp 2 Sprays NSEACH DAILY


Basaglar Kwikpen U-100 (Insulin Glargine,Hum.rec.anlog) 100 Unit/1 Ml Insuln.pen

60 Unit SQ BID


Novolog Flexpen (Insulin Aspart) 300 Units/3 Ml Solution 25 Units SQ TIDWM


Proair Hfa (Albuterol Sulfate) 1 Puff Puff 2 Puff IH Q4H PRN


Hydrochlorothiazide 12.5 Mg Tablet 12.5 Mg PO DAILY PRN


Eliquis (Apixaban) 5 Mg Tablet 5 Mg PO BID


Metoprolol Succinate 50 Mg Tab.er.24h 50 Mg PO DAILY


Rosuvastatin Calcium 40 Mg Tablet 40 Mg PO DAILY


Clopidogrel (Clopidogrel Bisulfate) 75 Mg Tablet 75 Mg PO DAILY


Montelukast Sodium 10 Mg Tablet 10 Mg PO HS


Fenofibrate (Fenofibrate Nanocrystallized) 145 Mg Tablet 145 Mg PO DAILY


Pregabalin 100 Mg Capsule 100 Mg PO TID


Meloxicam 15 Mg Tablet 15 Mg PO DAILY


Furosemide 40 Mg Tablet 40 Mg PO BID PRN


Omeprazole 20 Mg Capsule.dr 20 Mg PO DAILY


Assessment/Pt Instructions


Dr. Mcpherson in 1 week


Discharge Planning:  <30 minutes discharge planning





Discharge Physical Examination


Vital Signs





Vital Signs








  Date Time  Temp Pulse Resp B/P (MAP) Pulse Ox O2 Delivery O2 Flow Rate FiO2


 


10/5/21 10:47     94 Nasal Cannula 2.00 


 


10/5/21 08:41 36.3 81 18 125/68 (87)    


 


9/30/21 22:15        21








General Appearance:  No Apparent Distress, WD/WN, Chronically ill


Allergies:  


Coded Allergies:  


     lisinopril (Verified  Allergy, Mild, 5/12/21)


   MUSCLE ACHES


     losartan (Verified  Allergy, Mild, Rash, 5/12/21)


     lovastatin (Verified  Allergy, Mild, 5/12/21)


   MUSCLE ACHES


     rosiglitazone (Verified  Allergy, Mild, Hives, 5/12/21)


     umeclidinium (Verified  Allergy, Mild, Rash, 5/12/21)





Discharge Summary


Date of Admission


Oct 4, 2021 at 12:03


Date of Discharge





Discharge Date:  Oct 5, 2021


Admission Diagnosis


Assessment:


Acute volume overload


History of congestive heart failure


Atrial fibrillation


Hypertension


Diabetes mellitus


Suspicion for KENROY undiagnosed


Elevated BMI





Plan:


Supportive care


Cardiology consult


Pulmonary consult


Home meds


Discharge Diagnosis


Assessment:


Acute volume overload


Acute bronchitis with suspicion of obesity hypoventilation syndrome


History of congestive heart failure


Atrial fibrillation


Hypertension


Diabetes mellitus


Suspicion for KENROY undiagnosed


Elevated BMI


Severe iron deficiency with microcytosis


Vitamin B12 deficiency





Plan:


Supportive care


Cardiology consult


Pulmonary consult


Home meds





10/2/2021:


Supportive care


Appreciate cardiology


Oxygen supplementation


Change albuterol to MDI 


IV iron


B12 supplement





10/3/2021:


Iron supplement


Vitamin B12


Overnight O2 study





10/4/2021:


Appreciate pulmonary


Appreciate cardiology


IV antibiotics





(1) Volume overload


Status:  Acute


Qualifiers:  


   Qualified Codes:  E87.70 - Fluid overload, unspecified


(2) Afib


(3) Orthopnea


Status:  Acute











MENDEL OLIVERA DO                 Oct 5, 2021 10:56

## 2021-10-05 NOTE — PULMONARY PROGRESS NOTE
Subjective


Date Seen by a Provider:  Oct 5, 2021


Time Seen by a Provider:  12:15


Subjective/Events-last exam


75 y/o M with PMHx significant for COPD, CHF, DM, Afib, HTN., CAD admitted for 

heart failure exacerbation in addition to acute exacerbation of COPD. Currently 

on Airduo Q6, Doxycycline and Medrol dose pack. 


No major events overnight. He is on 2L NC with exertion. States he feels his 

breathing is better but continues to wheeze. CXR unremarkable.





Sepsis Event


Evaluation


Height, Weight, BMI


Height: '"


Weight: lbs. oz. kg; 39.12 BMI


Method:





Exam


Exam


Patient acknowledged, consented, and participated in this virtual visit which 

was conducted using real time audio/video


Vital Signs








  Date Time  Temp Pulse Resp B/P (MAP) Pulse Ox O2 Delivery O2 Flow Rate FiO2


 


10/5/21 10:47     94 Nasal Cannula 2.00 


 


10/5/21 08:41 36.3 81 18 125/68 (87) 92 Nasal Cannula 2.00 


 


10/5/21 08:00     91 Nasal Cannula 2.00 


 


10/5/21 06:54     97 Nasal Cannula 2.00 


 


10/5/21 04:16 36.8 85 18 115/60 (78) 93 Nasal Cannula 2.00 


 


10/4/21 23:57 36.5 95 18 120/67 (84) 92 Nasal Cannula 2.00 


 


10/4/21 20:52     93 Nasal Cannula 2.00 


 


10/4/21 20:21     91 Nasal Cannula 2.00 


 


10/4/21 20:00 36.4 98 18 127/74 (91) 91 Room Air  


 


10/4/21 17:14     97 Room Air  


 


10/4/21 16:01 36.6 94 20 160/76 (104) 90 Room Air  


 


10/4/21 14:31     96 Room Air  














I & O 


 


 10/5/21





 07:00


 


Intake Total 3242 ml


 


Output Total 5225 ml


 


Balance -1983 ml








Height & Weight


Height: '"


Weight: lbs. oz. kg; 39.12 BMI


Method:


General Appearance:  No Apparent Distress, WD/WN, Chronically ill, Obese


HEENT:  PERRL/EOMI, Normal ENT Inspection, Pharynx Normal, Moist Mucous 

Membranes


Neck:  Full Range of Motion, Normal Inspection, Non Tender


Respiratory:  No Accessory Muscle Use, No Respiratory Distress, Decreased Breath

Sounds, Rales, Wheezing


Cardiovascular:  Regular Rate, Rhythm


Capillary Refill:  Less Than 3 Seconds


Peripheral Pulses:  1+ Dorsalis Pedis (R), 1+ Left Dors-Pedis (L)


Gastrointestinal:  normal bowel sounds, soft; No distended, No guarding, No 

rebound; tenderness


Extremity:  Normal Capillary Refill, Normal Inspection, Normal Range of Motion, 

Non Tender, No Calf Tenderness, No Pedal Edema


Neurologic/Psychiatric:  Alert, Oriented x3, No Motor/Sensory Deficits, Normal 

Mood/Affect


Skin:  Normal Color, Warm/Dry


Lymphatic:  No Adenopathy





Results


Lab


Laboratory Tests


10/4/21 05:15








10/5/21 05:40











Assessment/Plan


Assessment/Plan


75 y/o M with PMHx significant for COPD, CHF, DM, Afib, HTN., CAD admitted for 

heart failure exacerbation in addition to acute exacerbation of COPD.





Would cont duonebs Q6H until wheezing resolves. Ok to d/c antibiotics as no 

evidence of infection. Pt continues to have mild lower extremity edema. Would 

cont diuresis per primary team.





Discussed with patient nurse who is in agreement with plan.











BOBBY CRANE MD                  Oct 5, 2021 12:20

## 2021-10-05 NOTE — CARDIOLOGY PROGRESS NOTE
Subjective


Date Seen by Provider:  Oct 5, 2021


Time Seen by Provider:  08:33


Subjective/Events-last exam


Sitting up in bed, reports productive cough, denies any chest pain or increased 

dyspnea.


Review of Systems


General:  No Chills, No Night Sweats; Fatigue; No Malaise, No Appetite, No Other


HEENT:  No Head Aches, No Visual Changes, No Eye Pain, No Ear Pain, No Dysphasi

a, No Sinus Congestion, No Post Nasal Drip, No Sore Throat, No Other


Pulmonary:  Dyspnea, Cough; No Pleuritic Chest Pain, No Other


Cardiovascular:  No: Chest Pain, Palpitations, Orthopnea, Paroxysmal Noc. 

Dyspnea, Edema, Lt Headedness, Other





Objective-Cardiology


Exam


Last Set of Vital Signs





Vital Signs








 9/30/21 10/5/21





 22:15 08:41


 


Temp  36.3


 


Pulse  81


 


Resp  18


 


B/P (MAP)  125/68 (87)


 


Pulse Ox  92


 


O2 Delivery  Nasal Cannula


 


O2 Flow Rate  2.00


 


FiO2 21 








I&O











Intake and Output 


 


 10/5/21





 00:00


 


Intake Total 3392 ml


 


Output Total 5975 ml


 


Balance -2583 ml


 


 


 


Intake Oral 3392 ml


 


Output Urine Total 5975 ml


 


# Voids 2


 


# Bowel Movements 2








General:  Alert, Oriented X3, Cooperative


HEENT:  Atraumatic, PERRLA


Neck:  Supple, No JVD


Lungs:  Other (bilateral rhonchi, wheezing )


Heart:  Regular Rate, Normal S1, Normal S2


Abdomen:  Normal Bowel Sounds


Extremities:  No Clubbing, No Cyanosis, Other (+1 edema BLE)


Skin:  No Rashes, No Breakdown


Neuro:  Normal Gait, Normal Speech


Psych/Mental Status:  Mental Status NL, Mood NL





Results


Lab


Laboratory Tests


10/5/21 05:40














A/P-Cardiology


Admission Diagnosis


PAF


Dyspnea 


HTP


HTN





Assessment/Plan


Acute exacerbation of COPD, bronchitis with Dyspnea, started on Medrol and 

doxycycline, managed by medical team





Paroxysmal atrial fibrillation, h/o failed cardioversion May 2021, had another 

cardioversion on 6/2/21 by Dr. Gardner at [facility], currently SR. Maintained on 

Eliquis, Cardizem CD, Multaq. 





[id number] score of 6, yearly risk of stroke without oral anticoagulation is 

over 6 percent, Maintained on Eliquis.





Cor pulmonale, 2D Echo done 10/1/21 showing EF 55-60%, PA 20-25mmHg, I believe 

it to be an underestimation of the PA pressure. Continue to diurese.





Coronary artery disease, history of multiple interventions in the past, 

reporting that he had multiple stents done at [facility] in the remote past 

and he had under deployed stent. Currently asymptomatic. Continue to monitor, 

planning to evaluate stress test as an outpatient. 





Abdominal aortic aneurysm, workup showed that the aneurysm measured 5 cm in the 

infrarenal abdominal aorta, he has bilateral iliac aneurysm, s/p AAA repair done

by Dr. Rivera in April 2021. CT abdomen and pelvis done 9/30/21 showing there is

an endoleak. Due to the phase of contrast, the source of the leak is difficult 

to identify.





Hypertension, controlled, continue to monitor.





Hyperlipidemia, hypertriglyceridemia, maintained on fenofibrate and 

rosuvastatin, continue to monitor as outpatient. 





Diabetes mellitus, followed and managed by primary care physician





COPD/KENROY, was intolerant to C Pap machine





History of multiple TIAs/CVA, maintained on Plavix and Eliquis





Obesity, BMI 38, we discussed weight loss.





Tobaccoism, stopped smoking 2 years ago, encouraged to continue with smoking 

cessation.























Supervisory-Addendum Brief


Supervisory Addendum


Participated in pt care:  history, MDM, physical


Personally performed:  exam, history, MDM


Care discussed with:  PA


Results interpretation:  Verified all documentation


Notes:


Patient was seen and evaluated with Isaura, examination performed, management 

plan was discussed, agree with the current scribed note, I made few changes to 

the note using Italic font


Patient was seen at bedside, laying down comfortably, still having cough and 

shortness of breath


Started on Medrol and doxycycline


Continue to monitor heart rate and blood pressure











ISAURA HUYNH   Oct 5, 2021 08:35


HAILEY ZEE MD               Oct 5, 2021 08:51

## 2021-10-12 NOTE — PHYSICIAN QUERY CLARIFICATION
PQ-Conflicting Diagnosis


Admission/Discharge


Admission Date: Oct 1, 2021 at 12:03 


Discharge Date:  Oct 5, 2021 at 13:30


Dr. Grigsby,








The medical record reflects the following clinical scenario:





History/Risk Factors:


fluid overload, HTN w/CHF,  PAF, DM





Clinical Findings:


There is no clinical or radiologic or BNP evidence of any decompensated CHF per 

Dr. Vargas. CXR no process. PBNP - 44.9





Treatment:


40 mg IV Lasix





Question:


Do you agree with the impression of  There is no clinical or radiologic or BNP 

evidence of any decompensated CHF per Dr. Vargas? 


Please document a response in Progress Note or Discharge Summary.





   1. Yes, fluid overload only





   2. No, patient has decompensated CHF





   3. Other, with explanation of clinical findings





   4. Clinically undetermined, no explanation for clinical findings.





PHYSICIAN RESPONSE


Do you agree w/Consulting Dx?:  Yes


Please remember a lack of response to the above will prompt a phone page by 

CDI/Coding staff. 





In responding to this query, please exercise your independent professional 

judgment.  The purpose of this communication is to more accurately reflect the 

complexity of your patients condition. The fact that a question is asked does 

not imply that any particular answer is desired or expected.  





Thank you for your timely response to this clarification.      


   


Requestors name: Meenakshi   





Phone # [phone]








THIS PHYSICIAN QUERY FORM IS A PERMANENT PART OF THE MEDICAL RECORD











MEENAKSHI HOLLEY                 Oct 12, 2021 10:46


MENDEL GRIGSBY DO                Oct 12, 2021 20:44

## 2021-10-12 NOTE — PHYSICIAN QUERY CLARIFICATION
PQ-Uncertain Diagnosis


Admission/Discharge


Admission Date: Oct 1, 2021 at 12:03 


Discharge Date:  Oct 5, 2021 at 13:30


 Dr. Grigsby,





The medical record reflects the following clinical scenario:





History/Risk Factors:


fluid overload, HTN w/ CHF, DM, PAF





Clinical Findings:


PH 7.42, PC02 42, P02 87, O2 Sat 97, SOB, orthopnea





Treatment:


Albuterol





Question:





Is respiratory failure a clinically valid diagnosis?


Respiratory failure was documented in the in the body of the discharge summary 

but respiratory insufficiency was documented in the discharge diagnoses of the 

discharge summary with no further documentation in the medical record. 





Please document a response in Progress Note or Discharge Summary.





   1.  Yes, clinically valid, respiratory failure condition resolved.





   2.  No, condition ruled out. respiratory insufficiency only





   3.  Other, with explanation of clinical findings.





   4.  Undetermined, no explanation for clinical findings.





PHYSICIAN RESPONSE


Diagnosis clinically valid:  Yes, Conditon resolved


Please remember a lack of response to the above will prompt a phone page by 

CDI/Coding staff. 





In responding to this query, please exercise your independent professional 

judgment.  The purpose of this communication is to more accurately reflect the 

complexity of your patients condition. The fact that a question is asked does 

not imply that any particular answer is desired or expected.  





Thank you for your timely response to this clarification.      


   


Requestors name: Meenakshi   





Phone # [phone]








THIS PHYSICIAN QUERY FORM IS A PERMANENT PART OF THE MEDICAL RECORD











MEENAKSHI HOLLEY                 Oct 12, 2021 11:00


MENDEL GRIGSBY DO                Oct 12, 2021 20:44

## 2022-08-31 ENCOUNTER — HOSPITAL ENCOUNTER (INPATIENT)
Dept: HOSPITAL 75 - ER FS | Age: 77
LOS: 3 days | Discharge: HOME | DRG: 811 | End: 2022-09-03
Attending: INTERNAL MEDICINE | Admitting: FAMILY MEDICINE
Payer: MEDICARE

## 2022-08-31 VITALS — BODY MASS INDEX: 37.63 KG/M2 | HEIGHT: 74.02 IN | WEIGHT: 293.21 LBS

## 2022-08-31 VITALS — SYSTOLIC BLOOD PRESSURE: 140 MMHG | DIASTOLIC BLOOD PRESSURE: 63 MMHG

## 2022-08-31 VITALS — SYSTOLIC BLOOD PRESSURE: 130 MMHG | DIASTOLIC BLOOD PRESSURE: 60 MMHG

## 2022-08-31 DIAGNOSIS — Z79.4: ICD-10-CM

## 2022-08-31 DIAGNOSIS — K21.9: ICD-10-CM

## 2022-08-31 DIAGNOSIS — D50.0: Primary | ICD-10-CM

## 2022-08-31 DIAGNOSIS — Z95.820: ICD-10-CM

## 2022-08-31 DIAGNOSIS — Z79.82: ICD-10-CM

## 2022-08-31 DIAGNOSIS — J43.9: ICD-10-CM

## 2022-08-31 DIAGNOSIS — K14.0: ICD-10-CM

## 2022-08-31 DIAGNOSIS — I50.9: ICD-10-CM

## 2022-08-31 DIAGNOSIS — Z86.73: ICD-10-CM

## 2022-08-31 DIAGNOSIS — G47.33: ICD-10-CM

## 2022-08-31 DIAGNOSIS — I11.0: ICD-10-CM

## 2022-08-31 DIAGNOSIS — K44.9: ICD-10-CM

## 2022-08-31 DIAGNOSIS — K22.11: ICD-10-CM

## 2022-08-31 DIAGNOSIS — Z79.01: ICD-10-CM

## 2022-08-31 DIAGNOSIS — E11.9: ICD-10-CM

## 2022-08-31 DIAGNOSIS — E78.00: ICD-10-CM

## 2022-08-31 DIAGNOSIS — Z87.891: ICD-10-CM

## 2022-08-31 DIAGNOSIS — I48.0: ICD-10-CM

## 2022-08-31 DIAGNOSIS — Z79.52: ICD-10-CM

## 2022-08-31 DIAGNOSIS — K29.70: ICD-10-CM

## 2022-08-31 DIAGNOSIS — Z95.5: ICD-10-CM

## 2022-08-31 DIAGNOSIS — I25.110: ICD-10-CM

## 2022-08-31 LAB
ALBUMIN SERPL-MCNC: 3.8 GM/DL (ref 3.2–4.5)
ALP SERPL-CCNC: 96 U/L (ref 40–136)
ALT SERPL-CCNC: 10 U/L (ref 0–55)
BASOPHILS # BLD AUTO: 0.1 10^3/UL (ref 0–0.1)
BASOPHILS NFR BLD AUTO: 1 % (ref 0–10)
BILIRUB SERPL-MCNC: 0.3 MG/DL (ref 0.1–1)
BUN/CREAT SERPL: 12
CALCIUM SERPL-MCNC: 8.8 MG/DL (ref 8.5–10.1)
CHLORIDE SERPL-SCNC: 97 MMOL/L (ref 98–107)
CO2 SERPL-SCNC: 24 MMOL/L (ref 21–32)
CREAT SERPL-MCNC: 1.18 MG/DL (ref 0.6–1.3)
EOSINOPHIL # BLD AUTO: 0.4 10^3/UL (ref 0–0.3)
EOSINOPHIL NFR BLD AUTO: 3 % (ref 0–10)
GFR SERPLBLD BASED ON 1.73 SQ M-ARVRAT: 64 ML/MIN
GLUCOSE SERPL-MCNC: 342 MG/DL (ref 70–105)
HCT VFR BLD CALC: 23 % (ref 40–54)
HGB BLD-MCNC: 6.3 G/DL (ref 13.3–17.7)
LYMPHOCYTES # BLD AUTO: 2 10^3/UL (ref 1–4)
LYMPHOCYTES NFR BLD AUTO: 19 % (ref 12–44)
MANUAL DIFFERENTIAL PERFORMED BLD QL: NO
MCH RBC QN AUTO: 16 PG (ref 25–34)
MCHC RBC AUTO-ENTMCNC: 27 G/DL (ref 32–36)
MCV RBC AUTO: 59 FL (ref 80–99)
MONOCYTES # BLD AUTO: 0.9 10^3/UL (ref 0–1)
MONOCYTES NFR BLD AUTO: 8 % (ref 0–12)
NEUTROPHILS # BLD AUTO: 6.9 10^3/UL (ref 1.8–7.8)
NEUTROPHILS NFR BLD AUTO: 65 % (ref 42–75)
PLATELET # BLD: 414 10^3/UL (ref 130–400)
PMV BLD AUTO: 9.8 FL (ref 9–12.2)
POTASSIUM SERPL-SCNC: 4.1 MMOL/L (ref 3.6–5)
PROT SERPL-MCNC: 6.6 GM/DL (ref 6.4–8.2)
SODIUM SERPL-SCNC: 132 MMOL/L (ref 135–145)
WBC # BLD AUTO: 10.5 10^3/UL (ref 4.3–11)

## 2022-08-31 PROCEDURE — 87081 CULTURE SCREEN ONLY: CPT

## 2022-08-31 PROCEDURE — 82947 ASSAY GLUCOSE BLOOD QUANT: CPT

## 2022-08-31 PROCEDURE — 86920 COMPATIBILITY TEST SPIN: CPT

## 2022-08-31 PROCEDURE — 85018 HEMOGLOBIN: CPT

## 2022-08-31 PROCEDURE — 82274 ASSAY TEST FOR BLOOD FECAL: CPT

## 2022-08-31 PROCEDURE — 36415 COLL VENOUS BLD VENIPUNCTURE: CPT

## 2022-08-31 PROCEDURE — 70491 CT SOFT TISSUE NECK W/DYE: CPT

## 2022-08-31 PROCEDURE — 36430 TRANSFUSION BLD/BLD COMPNT: CPT

## 2022-08-31 PROCEDURE — 93005 ELECTROCARDIOGRAM TRACING: CPT

## 2022-08-31 PROCEDURE — 80061 LIPID PANEL: CPT

## 2022-08-31 PROCEDURE — 86900 BLOOD TYPING SEROLOGIC ABO: CPT

## 2022-08-31 PROCEDURE — 85025 COMPLETE CBC W/AUTO DIFF WBC: CPT

## 2022-08-31 PROCEDURE — 81000 URINALYSIS NONAUTO W/SCOPE: CPT

## 2022-08-31 PROCEDURE — 86901 BLOOD TYPING SEROLOGIC RH(D): CPT

## 2022-08-31 PROCEDURE — 74174 CTA ABD&PLVS W/CONTRAST: CPT

## 2022-08-31 PROCEDURE — 86850 RBC ANTIBODY SCREEN: CPT

## 2022-08-31 PROCEDURE — 85014 HEMATOCRIT: CPT

## 2022-08-31 PROCEDURE — 80053 COMPREHEN METABOLIC PANEL: CPT

## 2022-08-31 NOTE — DIAGNOSTIC IMAGING REPORT
PROCEDURE: CT neck soft tissue with contrast.



TECHNIQUE: Multiple contiguous axial images were obtained through

the neck after the administration of contrast. Auto Exposure

Controls were utilized during the CT exam to meet ALARA standards

for radiation dose reduction. 



INDICATION:  Sore throat. Swelling of the tongue and jaw.



COMPARISON: None.



Findings:

The posterior nasopharynx and oropharynx demonstrate appropriate

symmetry. The patient is edentulous. There is no displacement of

the parapharyngeal fat planes. There is no abnormal process

evident within the prevertebral or retropharyngeal space. There

is no evidence of abnormal thickening of the epiglottis or

aryepiglottic folds. The vocal folds appear symmetric.



The parotid, submandibular and thyroid gland are unremarkable.



No pathologically enlarged cervical lymph nodes are evident. No

focal inflammatory changes are demonstrated. No soft tissue mass

or fluid collection demonstrated.



The vascular structures the neck demonstrate no evidence of

high-grade stenosis on this nondedicated exam. The visualized

lung apices are clear.



The visualized intracranial contents demonstrate no evidence of

pathologic intracranial enhancement or intracranial mass effect.

Visualized orbital contents are unremarkable. Retained secretions

and mucosal thickening are seen throughout the left maxillary

sinus. The mastoids and middle ears are clear.



No acute osseous abnormality in the cervical spine.



Impression:

1. Appropriate symmetry of the aerodigestive tract.

2. No evidence of pathologic adenopathy.

3. Sinusitis involving the left maxillary sinus.











Dictated by: 



  Dictated on workstation # [id number]

## 2022-08-31 NOTE — ED EENT
History of Present Illness


General


Chief Complaint:  Oral/Throat Problems


Stated Complaint:  TONGUE SWELLING/PAIN


Nursing Triage Note:  


Pt states that he has been having a sore throat for the last week and three days




ago it got a lot worse. He is able to swallow, but it is painful.


Source:  patient


Exam Limitations:  no limitations





History of Present Illness


Date Seen by Provider:  Aug 31, 2022


Time Seen by Provider:  11:30


Initial Comments


Patient is a 77-year-old edentulous male who presents with submental swelling 

pain tenderness which began gradually over 2 weeks and has progressively 

worsened over the past 3 days.  Patient denies painful swallowing, voice 

hoarseness, fever chills or sweats.  Denies gingival pain or swelling.  He wears

his dentures only when eating.  He has not been evaluated by PCP or his 

periodontist.  No medications or home remedies trialed prior to ED arrival.


Timing/Duration:  gradual


Severity:  mild


Location:  other


Prearrival Treatment:  other


Modifying Factors:  Improves With Other


Associated Symptoms:  other





Allergies and Home Medications


Allergies


Coded Allergies:  


     lisinopril (Verified  Allergy, Mild, 5/12/21)


   MUSCLE ACHES


     losartan (Verified  Allergy, Mild, Rash, 5/12/21)


     lovastatin (Verified  Allergy, Mild, 5/12/21)


   MUSCLE ACHES


     rosiglitazone (Verified  Allergy, Mild, Hives, 5/12/21)


     umeclidinium (Verified  Allergy, Mild, Rash, 5/12/21)





Patient Home Medication List


Home Medication List Reviewed:  Yes


Albuterol Sulfate (Proair Hfa) 1 Puff Puff, 2 PUFF IH Q4H PRN for SHORTNESS OF 

BREATH, (Reported)


   Entered as Reported by: TEDDY ROMAN on 5/12/21 1043


Apixaban (Eliquis) 5 Mg Tablet, 5 MG PO BID, (Reported)


   Entered as Reported by: TEDDY ROMAN on 5/12/21 1043


Clopidogrel Bisulfate (Clopidogrel) 75 Mg Tablet, 75 MG PO DAILY, (Reported)


   Entered as Reported by: TEDDY ROMAN on 5/12/21 1043


Cyclobenzaprine HCl (Cyclobenzaprine HCl) 10 Mg Tablet, 10 MG PO BID


   Prescribed by: MENDEL OLIVERA on 10/5/21 1055


Diltiazem HCl (Diltiazem 24Hr ER) 120 Mg Cap.er.24h, 120 MG PO DAILY, (Reported)


   Entered as Reported by: JULIETA DUEÑAS on 10/1/21 1124


Doxycycline Hyclate (Doxycycline Hyclate) 100 Mg Tablet, 100 MG PO BID


   Prescribed by: MENDEL OLIVERA on 10/5/21 1055


Dronedarone HCl (Multaq) 400 Mg Tablet, 400 MG PO BID, (Reported)


   Entered as Reported by: JULIETA DUEÑAS on 10/1/21 1124


Fenofibrate Nanocrystallized (Fenofibrate) 145 Mg Tablet, 145 MG PO DAILY, 

(Reported)


   Entered as Reported by: TEDDY ROMAN on 5/12/21 1043


Fluticasone Propionate (Fluticasone Propionate) 16 Gm [place].susp, 2 SPRAYS 

NSEACH DAILY, (Reported)


   Entered as Reported by: JULIETA DUEÑAS on 10/1/21 1124


Fluticasone/Salmeterol (Fluticasone-Salmeterol 113-14) 1 Each Aer.pow.ba, 0 EACH

IH RTBID


   Prescribed by: MENDEL OLIVERA on 10/5/21 1055


Fluticasone/Salmeterol (Advair 250-50 Diskus) 1 Each Blst.w.dev, 1 EACH IH BID


   Prescribed by: MENDEL OLIVERA on 10/5/21 1334


Furosemide (Furosemide) 40 Mg Tablet, 40 MG PO DAILY


   Prescribed by: MENDEL OLIVERA on 10/5/21 1055


Guaifenesin (Mucinex) 600 Mg Tab.er.12h, 600 MG PO BID


   Prescribed by: MENDEL OLIVERA on 10/5/21 1055


Hydrocodone/Acetaminophen (Hydrocodone-Acetamin 7.5-325) 1 Each Tablet, 1 EA PO 

Q6H PRN for PAIN-MODERATE (5-7), (Reported)


   Entered as Reported by: JULIETA DUEÑAS on 10/1/21 1124


Insulin Aspart (Novolog Flexpen) 300 Units/3 Ml Solution, 25 UNITS SQ TIDWM, 

(Reported)


   Entered as Reported by: TEDDY ROMAN on 5/12/21 1043


Insulin Glargine,Hum.rec.anlog (Basaglar Kwikpen U-100) 100 Unit/1 Ml 

Insuln.pen, 60 UNIT SQ BID, (Reported)


   Entered as Reported by: TEDDY ROMAN on 5/12/21 1043


Metoprolol Succinate (Metoprolol Succinate) 50 Mg Tab.er.24h, 50 MG PO DAILY, 

(Reported)


   Entered as Reported by: TEDDY ROMAN on 5/12/21 1043


Montelukast Sodium (Montelukast Sodium) 10 Mg Tablet, 10 MG PO HS, (Reported)


   Entered as Reported by: TEDDY ROMAN on 5/12/21 1043


Omeprazole (Omeprazole) 20 Mg Capsule.dr, 20 MG PO DAILY, (Reported)


   Entered as Reported by: TEDDY ROMAN on 5/12/21 1043


Potassium Chloride (Potassium Chloride) 10 Meq Tab.er.prt, 10 MEQ PO DAILY


   Prescribed by: MENDEL OLIVERA on 10/5/21 1055


Prednisone (Prednisone) 10 Mg Tab.ds.pk, 10 MG PO DAILY


   Prescribed by: MENDEL OLIVERA on 10/5/21 1055


Pregabalin (Pregabalin) 100 Mg Capsule, 100 MG PO TID, (Reported)


   Entered as Reported by: TEDDY ROMAN on 5/12/21 1043


Rosuvastatin Calcium (Rosuvastatin Calcium) 40 Mg Tablet, 40 MG PO DAILY, 

(Reported)


   Entered as Reported by: TEDDY ROMAN on 5/12/21 1043


Spironolactone (Spironolactone) 25 Mg Tablet, 25 MG PO DAILY PRN for FLUID 

RETENTION, (Reported)


   Entered as Reported by: JULIETA DUEÑAS on 10/1/21 1124





Review of Systems


Review of Systems


Constitutional:  see HPI


Eyes:  See HPI


Ears:  See HPI


Nose:  see HPI


Mouth:  see HPI


Throat:  see HPI


Respiratory:  see HPI


Cardiovascular:  see HPI





Past Medical-Social-Family Hx


Patient Social History


Tobacco Use?:  Yes


Smokeless Tobacco Frequency:  Current Everyday User


Use of E-Cig and/or Vaping dev:  No


Substance use?:  No


Alcohol Use?:  No


Pt feels they are or have been:  No





Immunizations Up To Date


First/Initial COVID19 Vaccinat:  APRIL 2021


Second COVID19 Vaccination Alberto:  MAY 2021





Past Medical History


Surgery/Hospitalization HX:  


aneurysm repair; multiple (aortic/iliac), Appendectomy, pilonidal cyst, 


tailbone, HTN, chronic A Fib, hyperlipidemia, IDDM/Type II, COPD, KENROY (no CPAP 


tolerance), skin cancer


Appendectomy


Respiratory:  Yes


Emphysema


Cardiac:  Yes


Atrial Fibrillation, Coronary Artery Disease, High Cholesterol, Hypertension


Neurological:  Yes


TIA


Genitourinary:  No


Gastrointestinal:  Yes


Gastroesophageal Reflux


Cancer:  Yes


Skin


Did You Recieve Any Treatments:  Yes


What Type of Treatment Did You:  Surgical Intervention


Blood Disorders:  No





Physical Exam


Vital Signs





Vital Signs - First Documented








 8/31/22





 11:25


 


Temp 36.2


 


Pulse 76


 


Resp 20


 


B/P (MAP) 150/67 (94)


 


Pulse Ox 95


 


O2 Delivery Room Air








Height, Weight, BMI


Height: '"


Weight: lbs. oz. kg; 37.00 BMI


Method:


General Appearance:  WD/WN, cachetic


Eyes:  bilateral eye normal inspection, bilateral eye PERRL


Ears:  bilateral ear auricle normal, bilateral ear canal normal


Nose:  normal inspection


Mouth/Throat:  normal mouth inspection, other (Submandibular swelling, pain with

tenderness. No erythema induration or palpable masses.  No cervical lymphadenopa

thy appreciated.  Edentulous, no gingival swelling or sublingual swelling 

appreciated)


Neck:  supple


Cardiovascular:  regular rate, rhythm


Respiratory:  lungs clear, decreased breath sounds


Gastrointestinal:  soft


Neurologic/Psychiatric:  CNs II-XII nml as tested, no motor/sensory deficits, 

alert





Progress/Results/Core Measures


Results/Orders


Lab Results





Laboratory Tests








Test


 8/31/22


12:34 Range/Units


 


 


White Blood Count


 10.5 


 4.3-11.0


10^3/uL


 


Red Blood Count


 3.96 L


 4.30-5.52


10^6/uL


 


Hemoglobin 6.3 *L 13.3-17.7  g/dL


 


Hematocrit 23 L 40-54  %


 


Mean Corpuscular Volume 59 L 80-99  fL


 


Mean Corpuscular Hemoglobin 16 L 25-34  pg


 


Mean Corpuscular Hemoglobin


Concent 27 L


 32-36  g/dL





 


Red Cell Distribution Width 21.1 H 10.0-14.5  %


 


Platelet Count


 414 H


 130-400


10^3/uL


 


Mean Platelet Volume 9.8  9.0-12.2  fL


 


Immature Granulocyte % (Auto) 3   %


 


Neutrophils (%) (Auto) 65  42-75  %


 


Lymphocytes (%) (Auto) 19  12-44  %


 


Monocytes (%) (Auto) 8  0-12  %


 


Eosinophils (%) (Auto) 3  0-10  %


 


Basophils (%) (Auto) 1  0-10  %


 


Neutrophils # (Auto)


 6.9 


 1.8-7.8


10^3/uL


 


Lymphocytes # (Auto)


 2.0 


 1.0-4.0


10^3/uL


 


Monocytes # (Auto)


 0.9 


 0.0-1.0


10^3/uL


 


Eosinophils # (Auto)


 0.4 H


 0.0-0.3


10^3/uL


 


Basophils # (Auto)


 0.1 


 0.0-0.1


10^3/uL


 


Immature Granulocyte # (Auto)


 0.3 H


 0.0-0.1


10^3/uL


 


Percent Immature Platelet


Fraction 4.4 


 0.0-7.6  %





 


Sodium Level 132 L 135-145  MMOL/L


 


Potassium Level 4.1  3.6-5.0  MMOL/L


 


Chloride Level 97 L   MMOL/L


 


Carbon Dioxide Level 24  21-32  MMOL/L


 


Anion Gap 11  5-14  MMOL/L


 


Blood Urea Nitrogen 14  7-18  MG/DL


 


Creatinine


 1.18 


 0.60-1.30


MG/DL


 


Estimat Glomerular Filtration


Rate 64 


  





 


BUN/Creatinine Ratio 12   


 


Glucose Level 342 H   MG/DL


 


Calcium Level 8.8  8.5-10.1  MG/DL


 


Corrected Calcium 9.0  8.5-10.1  MG/DL


 


Total Bilirubin 0.3  0.1-1.0  MG/DL


 


Aspartate Amino Transf


(AST/SGOT) 16 


 5-34  U/L





 


Alanine Aminotransferase


(ALT/SGPT) 10 


 0-55  U/L





 


Alkaline Phosphatase 96    U/L


 


Total Protein 6.6  6.4-8.2  GM/DL


 


Albumin 3.8  3.2-4.5  GM/DL








My Orders





Orders - TOMAS ENNIS DO


Cbc With Automated Diff (8/31/22 11:42)


Comprehensive Metabolic Panel (8/31/22 11:42)


Ketorolac Injection (Toradol Injection) (8/31/22 11:45)


Iohexol Injection (Omnipaque 350 Mg/Ml 1 (8/31/22 12:15)


Received Contrast (Hold Metformin- Contr (8/31/22 12:15)


Ns (Ivpb) (Sodium Chloride 0.9% Ivpb Bag (8/31/22 12:15)


Ct Neck (Soft Tissue) W (8/31/22 11:42)


Occult Blood Stool (8/31/22 13:44)


Clindamycin Capsule (Cleocin Capsule) (8/31/22 15:00)





Medications Given in ED





Current Medications








 Medications  Dose


 Ordered  Sig/Jayda


 Route  Start Time


 Stop Time Status Last Admin


Dose Admin


 


 Iohexol  100 ml  ONCE  ONCE


 IV  8/31/22 12:15


 8/31/22 12:16 DC 8/31/22 13:20


75 ML


 


 Ketorolac


 Tromethamine  15 mg  ONCE  ONCE


 IVP  8/31/22 11:45


 8/31/22 11:46 DC 8/31/22 12:40


15 MG


 


 Sodium Chloride  100 ml  ONCE  ONCE


 IV  8/31/22 12:15


 8/31/22 12:16 DC 8/31/22 13:20


100 ML








Vital Signs/I&O











 8/31/22





 11:25


 


Temp 36.2


 


Pulse 76


 


Resp 20


 


B/P (MAP) 150/67 (94)


 


Pulse Ox 95


 


O2 Delivery Room Air














Blood Pressure Mean:                    94











Departure


Communication (Admissions)


CT maxillofacial with IV contrast: No acute findings per radiology report.





Patient currently on Plavix and Eliquis for treatment of chronic A. fib and 

vascular stents.  He has an incidental hemoglobin of 6.3. Hemoccult testing is 

negative.  Case reviewed with Dr. Stearns patient's cardiologist.  Recommendations

are to hold current anticoagulation medication, hospital admission for 

transfusion and further evaluation of anemia.  The patient is not currently 

having any cardiac related symptoms.  His oral exam and CT findings are 

consistent with glossitis.  We will place him on oral antibiotics and admit to 

[facility] hospitalist service.





Impression





   Primary Impression:  


   Anemia


   Additional Impressions:  


   Anticoagulant long-term use


   Glossitis


Disposition:  09 ADMITTED AS INPATIENT


Condition:  Stable





Admissions


Decision to Admit Reason:  Admit from ER (General)


Decision to Admit/Date:  Aug 31, 2022


Time/Decision to Admit Time:  14:30





Transfer


Time Spoke to Accepting Phy:  15:14 (Dr. Pond)





Departure-Patient Inst.


Referrals:  


SELF,LITA HERNANDEZ (PCP/Family)


Primary Care Physician











TOMAS ENNIS DO                   Aug 31, 2022 12:02

## 2022-09-01 VITALS — SYSTOLIC BLOOD PRESSURE: 140 MMHG | DIASTOLIC BLOOD PRESSURE: 69 MMHG

## 2022-09-01 VITALS — DIASTOLIC BLOOD PRESSURE: 62 MMHG | SYSTOLIC BLOOD PRESSURE: 132 MMHG

## 2022-09-01 VITALS — SYSTOLIC BLOOD PRESSURE: 143 MMHG | DIASTOLIC BLOOD PRESSURE: 79 MMHG

## 2022-09-01 VITALS — DIASTOLIC BLOOD PRESSURE: 60 MMHG | SYSTOLIC BLOOD PRESSURE: 135 MMHG

## 2022-09-01 VITALS — DIASTOLIC BLOOD PRESSURE: 63 MMHG | SYSTOLIC BLOOD PRESSURE: 135 MMHG

## 2022-09-01 VITALS — DIASTOLIC BLOOD PRESSURE: 71 MMHG | SYSTOLIC BLOOD PRESSURE: 137 MMHG

## 2022-09-01 VITALS — DIASTOLIC BLOOD PRESSURE: 59 MMHG | SYSTOLIC BLOOD PRESSURE: 132 MMHG

## 2022-09-01 VITALS — DIASTOLIC BLOOD PRESSURE: 57 MMHG | SYSTOLIC BLOOD PRESSURE: 122 MMHG

## 2022-09-01 VITALS — DIASTOLIC BLOOD PRESSURE: 73 MMHG | SYSTOLIC BLOOD PRESSURE: 158 MMHG

## 2022-09-01 VITALS — SYSTOLIC BLOOD PRESSURE: 132 MMHG | DIASTOLIC BLOOD PRESSURE: 70 MMHG

## 2022-09-01 VITALS — SYSTOLIC BLOOD PRESSURE: 122 MMHG | DIASTOLIC BLOOD PRESSURE: 57 MMHG

## 2022-09-01 VITALS — SYSTOLIC BLOOD PRESSURE: 146 MMHG | DIASTOLIC BLOOD PRESSURE: 77 MMHG

## 2022-09-01 LAB
ALBUMIN SERPL-MCNC: 3.3 GM/DL (ref 3.2–4.5)
ALP SERPL-CCNC: 65 U/L (ref 40–136)
ALT SERPL-CCNC: 11 U/L (ref 0–55)
APTT PPP: YELLOW S
BACTERIA #/AREA URNS HPF: NEGATIVE /HPF
BASOPHILS # BLD AUTO: 0.1 10^3/UL (ref 0–0.1)
BASOPHILS NFR BLD AUTO: 1 % (ref 0–10)
BILIRUB SERPL-MCNC: 0.6 MG/DL (ref 0.1–1)
BILIRUB UR QL STRIP: NEGATIVE
BUN/CREAT SERPL: 15
CALCIUM SERPL-MCNC: 8.6 MG/DL (ref 8.5–10.1)
CHLORIDE SERPL-SCNC: 104 MMOL/L (ref 98–107)
CHOLEST SERPL-MCNC: 104 MG/DL (ref ?–200)
CO2 SERPL-SCNC: 24 MMOL/L (ref 21–32)
CREAT SERPL-MCNC: 1.02 MG/DL (ref 0.6–1.3)
EOSINOPHIL # BLD AUTO: 0.4 10^3/UL (ref 0–0.3)
EOSINOPHIL NFR BLD AUTO: 4 % (ref 0–10)
FIBRINOGEN PPP-MCNC: CLEAR MG/DL
GFR SERPLBLD BASED ON 1.73 SQ M-ARVRAT: 76 ML/MIN
GLUCOSE SERPL-MCNC: 120 MG/DL (ref 70–105)
GLUCOSE UR STRIP-MCNC: (no result) MG/DL
HCT VFR BLD CALC: 23 % (ref 40–54)
HCT VFR BLD CALC: 26 % (ref 40–54)
HDLC SERPL-MCNC: 26 MG/DL (ref 40–60)
HGB BLD-MCNC: 5.9 G/DL (ref 13.3–17.7)
HGB BLD-MCNC: 6.9 G/DL (ref 13.3–17.7)
KETONES UR QL STRIP: NEGATIVE
LEUKOCYTE ESTERASE UR QL STRIP: NEGATIVE
LYMPHOCYTES # BLD AUTO: 1.7 10^3/UL (ref 1–4)
LYMPHOCYTES NFR BLD AUTO: 17 % (ref 12–44)
MANUAL DIFFERENTIAL PERFORMED BLD QL: NO
MCH RBC QN AUTO: 15 PG (ref 25–34)
MCHC RBC AUTO-ENTMCNC: 26 G/DL (ref 32–36)
MCV RBC AUTO: 60 FL (ref 80–99)
MONOCYTES # BLD AUTO: 0.8 10^3/UL (ref 0–1)
MONOCYTES NFR BLD AUTO: 7 % (ref 0–12)
NEUTROPHILS # BLD AUTO: 7.4 10^3/UL (ref 1.8–7.8)
NEUTROPHILS NFR BLD AUTO: 70 % (ref 42–75)
NITRITE UR QL STRIP: NEGATIVE
PH UR STRIP: 5.5 [PH] (ref 5–9)
PLATELET # BLD: 381 10^3/UL (ref 130–400)
PMV BLD AUTO: 10 FL (ref 9–12.2)
POTASSIUM SERPL-SCNC: 3.9 MMOL/L (ref 3.6–5)
PROT SERPL-MCNC: 6.3 GM/DL (ref 6.4–8.2)
PROT UR QL STRIP: NEGATIVE
RBC #/AREA URNS HPF: (no result) /HPF
SODIUM SERPL-SCNC: 137 MMOL/L (ref 135–145)
SP GR UR STRIP: <=1.005 (ref 1.02–1.02)
SQUAMOUS #/AREA URNS HPF: (no result) /HPF
TRIGL SERPL-MCNC: 74 MG/DL (ref ?–150)
VLDLC SERPL CALC-MCNC: 15 MG/DL (ref 5–40)
WBC # BLD AUTO: 10.6 10^3/UL (ref 4.3–11)
WBC #/AREA URNS HPF: (no result) /HPF

## 2022-09-01 RX ADMIN — PANTOPRAZOLE SODIUM SCH MG: 40 INJECTION, POWDER, FOR SOLUTION INTRAVENOUS at 22:05

## 2022-09-01 RX ADMIN — PANTOPRAZOLE SODIUM SCH MG: 40 INJECTION, POWDER, FOR SOLUTION INTRAVENOUS at 09:01

## 2022-09-01 NOTE — DIAGNOSTIC IMAGING REPORT
INDICATION: Abdominal pain and swelling, history of abdominal

aortic stent graft for abdominal aortic aneurysm.



TECHNIQUE: Multiple contiguous axial images were obtained through

the abdomen and pelvis after the uneventful bolus administration

of intravenous contrast. Sagittal and coronal MIP reconstructions

with then performed. All CT scans use one or more of the

following dose optimizing techniques: Automated exposure control,

MA and/or KvP adjustment based on patient size and exam type or

iterative reconstruction. 



Comparison made to prior CT of 09/30/2021.



FINDINGS: The visualized portions of the lung bases are clear.

There were no pleural fluid collections. There is no free

intraperitoneal air.



The liver shows no focal lesion. There are multiple gallstones in

the gallbladder. The spleen, adrenals, and pancreas are normal.

The kidneys bilaterally are unremarkable except for a small cyst

in the right kidney. There is no retroperitoneal mass or

adenopathy. There is no ascites. Visualized bowel loops show no

sign of obstruction. There is no focal bowel wall thickening.

There is a small periumbilical hernia containing fat.



CTA images demonstrate abdominal aortic aneurysm with stent graft

device in place. Celiac trunk is patent and without stenosis.

There is a normal variant with the right hepatic artery arising

directly from the arch. The superior mesenteric artery is patent

and without stenosis. There are single renal arteries bilaterally

which are patent and without stenosis.



Stent graft is seen from the infrarenal abdominal aorta,

terminating in the left distal common iliac artery on the left

side, and on the right side, there is extension of the stent

graft into the internal and external iliac arteries. The external

iliac arteries and common femoral arteries and femoral

bifurcations are patent. There is evidence of endoleak with

contrast in the inferior portion of the aneurysm sac. This was

also the case on previous CT of 09/30/2021. Maximal aortic

aneurysm sac diameter was 4.3 x 4.5 cm, this is unchanged

compared to my measurements on the previous study.



Delayed images demonstrate patency of the hepatic veins and

portal vein and splenic vein and SMV.



IMPRESSION:



Abdominal aortic aneurysm with stent graft device in place as

above. There is a small endoleak in the aneurysm sac inferiorly,

but aneurysm sac appears stable in size measuring 4.5 x 4.3 cm.

There is no major aortic branch stenosis.



There are incidental gallstones in the gallbladder. There is no

abdominal mass or abnormal fluid collection or sign of bowel

obstruction or bowel wall thickening. There is a fat-containing

periumbilical hernia.



Dictated by: 



  Dictated on workstation # [id number]

## 2022-09-01 NOTE — CONSULTATION-CARDIOLOGY
HPI-Cardiology


Cardiology Consultation


Date of Consultation


9/1/22


Date of Admission





Time Seen by Provider:  07:50


Indication:  Chest pain





HPI


77-year-old gentleman with extensive cardiac history, reported that for the past

few weeks has been having GI discomfort with abdominal discomfort, 

gastroesophageal reflux and sore throat, he lost his voice.  Reported 2 days ago

an episode of chest pain left-sided with bilateral arm pain.  Shortness of 

breath.  Generalized fatigue and loss of energy.  Denied any melena.  No blood 

in the stool.  He came into the emergency room for increasing weakness and 

fatigue.  Loss of energy.  Noted to have severe anemia





Home Medications & Allergies


Allergies:  


Coded Allergies:  


     lisinopril (Verified  Allergy, Mild, 5/12/21)


   MUSCLE ACHES


     losartan (Verified  Allergy, Mild, Rash, 5/12/21)


     lovastatin (Verified  Allergy, Mild, 5/12/21)


   MUSCLE ACHES


     rosiglitazone (Verified  Allergy, Mild, Hives, 5/12/21)


     umeclidinium (Verified  Allergy, Mild, Rash, 5/12/21)


Home Medication List Reviewed:  Yes





PMH-Social-Family Hx


Patient Social History


Marital Status:  


Employed/Student:  retired


Smoking Status:  Former Smoker


Type Used:  Smokeless Tobacco


Recent Hopitalizations:  Yes (3 WEEKS AGO ABD ANEURYSM REPAIR)


Have you traveled recently?:  No


Alcohol Use?:  No





Immunizations Up To Date


Date of Influenza Vaccine:  Oct 1, 2020





Past Medical History


Discussed below





Family Medical History


Family Medical Hx


Noncontributory





Review of Systems-General


Review of Systems


Constitutional:  see HPI, malaise, weakness


EENTM:  see HPI, no symptoms reported


Respiratory:  see HPI; No cough; dyspnea on exertion; No hemoptysis, No 

orthopnea, No phlegm, No short of breath, No stridor, No wheezing, No other


Cardiovascular:  see HPI, chest pain; No edema, No Hx of Intervention, No 

palpitations, No syncope, No vascular heart diseas, No other


Gastrointestinal:  no symptoms reported, see HPI


Genitourinary:  no symptoms reported, see HPI


Musculoskeletal:  no symptoms reported, see HPI


Skin:  no symptoms reported, see HPI


Psychiatric/Neurological:  No Symptoms Reported, See HPI





Reviewed Test Results


Reviewed Test Results


Lab





Laboratory Tests








Test


 8/31/22


12:34 8/31/22


20:07 9/1/22


05:00 9/1/22


05:08 Range/Units


 


 


White Blood Count


 10.5 


 


 10.6 


 


 4.3-11.0


10^3/uL


 


Red Blood Count


 3.96 L


 


 3.83 L


 


 4.30-5.52


10^6/uL


 


Hemoglobin 6.3 *L  5.9 *L  13.3-17.7  g/dL


 


Hematocrit 23 L  23 L  40-54  %


 


Mean Corpuscular Volume 59 L  60 L  80-99  fL


 


Mean Corpuscular Hemoglobin 16 L  15 L  25-34  pg


 


Mean Corpuscular Hemoglobin


Concent 27 L


 


 26 L


 


 32-36  g/dL





 


Red Cell Distribution Width 21.1 H  20.7 H  10.0-14.5  %


 


Platelet Count


 414 H


 


 381 


 


 130-400


10^3/uL


 


Mean Platelet Volume 9.8   10.0   9.0-12.2  fL


 


Immature Granulocyte % (Auto) 3   2    %


 


Neutrophils (%) (Auto) 65   70   42-75  %


 


Lymphocytes (%) (Auto) 19   17   12-44  %


 


Monocytes (%) (Auto) 8   7   0-12  %


 


Eosinophils (%) (Auto) 3   4   0-10  %


 


Basophils (%) (Auto) 1   1   0-10  %


 


Neutrophils # (Auto)


 6.9 


 


 7.4 


 


 1.8-7.8


10^3/uL


 


Lymphocytes # (Auto)


 2.0 


 


 1.7 


 


 1.0-4.0


10^3/uL


 


Monocytes # (Auto)


 0.9 


 


 0.8 


 


 0.0-1.0


10^3/uL


 


Eosinophils # (Auto)


 0.4 H


 


 0.4 H


 


 0.0-0.3


10^3/uL


 


Basophils # (Auto)


 0.1 


 


 0.1 


 


 0.0-0.1


10^3/uL


 


Immature Granulocyte # (Auto)


 0.3 H


 


 0.2 H


 


 0.0-0.1


10^3/uL


 


Percent Immature Platelet


Fraction 4.4 


 


 


 


 0.0-7.6  %





 


Sodium Level 132 L  137   135-145  MMOL/L


 


Potassium Level 4.1   3.9   3.6-5.0  MMOL/L


 


Chloride Level 97 L  104     MMOL/L


 


Carbon Dioxide Level 24   24   21-32  MMOL/L


 


Anion Gap 11   9   5-14  MMOL/L


 


Blood Urea Nitrogen 14   15   7-18  MG/DL


 


Creatinine


 1.18 


 


 1.02 


 


 0.60-1.30


MG/DL


 


Estimat Glomerular Filtration


Rate 64 


 


 76 


 


  





 


BUN/Creatinine Ratio 12   15    


 


Glucose Level 342 H  120 H    MG/DL


 


Calcium Level 8.8   8.6   8.5-10.1  MG/DL


 


Corrected Calcium 9.0   9.2   8.5-10.1  MG/DL


 


Total Bilirubin 0.3   0.6   0.1-1.0  MG/DL


 


Aspartate Amino Transf


(AST/SGOT) 16 


 


 20 


 


 5-34  U/L





 


Alanine Aminotransferase


(ALT/SGPT) 10 


 


 11 


 


 0-55  U/L





 


Alkaline Phosphatase 96   65     U/L


 


Total Protein 6.6   6.3 L  6.4-8.2  GM/DL


 


Albumin 3.8   3.3   3.2-4.5  GM/DL


 


Glucometer  273 H  134 H   MG/DL











Physical Exam


Physical Exam


Vital Signs





Vital Signs - First Documented








 8/31/22 8/31/22 8/31/22





 11:25 16:58 20:20


 


Temp 36.2  


 


Pulse 76  


 


Resp 20  


 


B/P (MAP) 150/67 (94)  


 


Pulse Ox 95  


 


O2 Delivery Room Air  


 


O2 Flow Rate  2.00 


 


FiO2   28





Capillary Refill : Less Than 3 Seconds


Height, Weight, BMI


Height: '"


Weight: lbs. oz. kg; 37.63 BMI


Method:


General Appearance:  No Apparent Distress, WD/WN


Eyes:  Bilateral Eye Normal Inspection, Bilateral Eye PERRL


HEENT:  PERRL/EOMI, TMs Normal, Normal ENT Inspection, Pharynx Normal, Moist 

Mucous Membranes


Neck:  Full Range of Motion, Normal Inspection, Non Tender, Supple, Carotid 

Bruit


Respiratory:  Chest Non Tender, Normal Breath Sounds, No Accessory Muscle Use, 

No Respiratory Distress


Cardiovascular:  Regular Rate, Rhythm, No Edema, No JVD, Normal Peripheral 

Pulses, Systolic Murmur, Gallop/S3


Gastrointestinal:  Normal Bowel Sounds, No Organomegaly, No Pulsatile Mass, Non 

Tender, Soft


Back:  Normal Inspection, No CVA Tenderness, No Vertebral Tenderness


Extremity:  Normal Capillary Refill, Normal Inspection, Normal Range of Motion, 

Non Tender, No Calf Tenderness, No Pedal Edema


Neurologic/Psychiatric:  Alert, Oriented x3, No Motor/Sensory Deficits, Normal 

Mood/Affect


Skin:  Normal Color, Warm/Dry


Lymphatic:  No Adenopathy





A/P-Cardiology


Admission Diagnosis


Chest pain


Anemia


Coronary artery disease


Paroxysmal atrial fibrillation





Assessment/Plan


Chest pain, unstable angina, most probably secondary to coronary artery disease 

and severe anemia.


Recommend blood transfusion and evaluate for source of blood loss


Holding aspirin and Plavix for now and monitor.





Anemia, most probably GI blood loss.


Discussed with Dr. Perez and recommended endoscopy


Will transfuse 1 unit of packed RBCs.





Paroxysmal atrial fibrillation, h/o failed cardioversion in May 2021, had a

nother cardioversion on 6.2.21 by Dr. Gardner at [facility], Maintained on Eliquis, 

Cardizem CD, Multaq.


I will hold Eliquis for now and monitor





[id number] score of 6, yearly risk of stroke without oral anticoagulation is 

over 6 percent, holding Eliquis and monitor





Coronary artery disease, history of multiple interventions in the past, 

reporting that he had multiple stents done at [facility] in the remote past 

and he had under deployed stent.


Has been on aspirin and Plavix, currently on hold.





Abdominal aortic aneurysm, workup showed that the aneurysm measured 5 cm in the 

infrarenal abdominal aorta, he has bilateral iliac aneurysm, s/p AAA repair done

by Dr. Rivera in April 2021.


Discussed with Dr. Perez, will evaluate CT of the abdomen and pelvis.  Patient 

reportedly in the lower extremity most probably around the aneurysm in the iliac

artery





Hypertension, restart home medication monitor blood pressure





Hyperlipidemia, hypertriglyceridemia, maintained on fenofibrate and 

rosuvastatin.


I will evaluate lipid profile





Diabetes mellitus, followed and managed by primary care physician





COPD/KENROY, was intolerant to C Pap machine. Hospitalized last month for AE COPD. 

Will refer to pulmonologist for further evaluation.





History of multiple TIAs/CVA, maintained on Plavix and Eliquis





Obesity, BMI 37, we discussed weight loss.





Tobaccoism, stopped smoking 2 years ago, encouraged to continue with smoking 

cessation.











HAILEY ZEE MD               Sep 1, 2022 07:56

## 2022-09-01 NOTE — CONSULTATION - SURGERY
History of Present Illness


History of Present Illness


Patient Consulted On(roland/time)


9/1/22


 17:56


Date Seen by Provider:  Sep 1, 2022


Time Seen by Provider:  07:20


Reason for Visit:  Chest pain


History of Present Illness


Consult requested by Dr. Stearns for anemia.





Patient is a 77 year old male who presented to ER due to neck pain.  He was 

found to be anemic. He denies any blood in his stools.  He has been having some 

lower abdominal discomfort, also epigastric discomfort as well.  He is on 

anticoagulation and antiplatelets. Hgb was down to 5.9.


Patient getting transfusion.  Patient states nothing makes pains better or 

worse.  Denies n/v fever sweats chills shortness of breath or chest pain.  Has 

known endoleak from aortic/iliac stenting.





Allergies and Home Medications


Allergies


Coded Allergies:  


     lisinopril (Verified  Allergy, Mild, 5/12/21)


   MUSCLE ACHES


     losartan (Verified  Allergy, Mild, Rash, 5/12/21)


     lovastatin (Verified  Allergy, Mild, 5/12/21)


   MUSCLE ACHES


     rosiglitazone (Verified  Allergy, Mild, Hives, 5/12/21)


     umeclidinium (Verified  Allergy, Mild, Rash, 5/12/21)





Patient Home Medication List


Home Medication List Reviewed:  Yes


Albuterol Sulfate (Proair Hfa) 1 Puff Puff, 2 PUFF IH Q4H PRN for SHORTNESS OF 

BREATH, (Reported)


   Entered as Reported by: TEDDY ROMAN on 5/12/21 1043


   Last Action: Reviewed


Apixaban (Eliquis) 5 Mg Tablet, 5 MG PO BID, (Reported)


   Entered as Reported by: TEDDY ROMAN on 5/12/21 1043


   Last Action: Reviewed


Aspirin (Aspirin EC) 81 Mg Tablet.dr, 81 MG PO DAILY, (Reported)


   Entered as Reported by: JULIETA DUEÑAS on 9/1/22 1054


   Last Action: Reviewed


Clopidogrel Bisulfate (Clopidogrel) 75 Mg Tablet, 75 MG PO DAILY, (Reported)


   Entered as Reported by: TEDDY ROMAN on 5/12/21 1043


   Last Action: Reviewed


Diltiazem HCl (Diltiazem ER) 120 Mg Capsule.er, 120 MG PO DAILY, (Reported)


   Entered as Reported by: JULIETA DUEÑAS on 9/1/22 1048


   Last Action: Reviewed


Dronedarone HCl (Multaq) 400 Mg Tablet, 400 MG PO BID, (Reported)


   Entered as Reported by: JULIETA DUEÑAS on 10/1/21 1124


   Last Action: Reviewed


Fenofibrate Nanocrystallized (Fenofibrate) 145 Mg Tablet, 145 MG PO DAILY, 

(Reported)


   Entered as Reported by: TEDDY ROMAN on 5/12/21 1043


   Last Action: Reviewed


Fluticasone Propionate (Fluticasone Propionate) 50 Mcg/Actuation Pitman.susp, 2 

SPRAYS NSEACH DAILY PRN for CONGESTION, (Reported)


   Entered as Reported by: JULIETA DUEÑAS on 10/1/21 1124


   Last Action: Reviewed


Fluticasone/Salmeterol (Advair 250-50 Diskus) 250 Mcg-50 Mcg/Dose Blst.w.dev, 1 

EACH IH BID, (Reported)


   Entered as Reported by: JULIETA DUEÑAS on 9/1/22 1048


   Last Action: Reviewed


Furosemide (Furosemide) 40 Mg Tablet, 40 MG PO DAILY, (Reported)


   Entered as Reported by: JULIETA DUEÑAS on 9/1/22 1048


   Last Action: Reviewed


Insulin Aspart (Novolog Flexpen) 100 Unit/Ml (3 Ml) Solution, 28 UNITS SQ AC, 

(Reported)


   Entered as Reported by: TEDDY ROMAN on 5/12/21 1043


   Last Action: Reviewed


Insulin Glargine,Hum.rec.anlog (Basaglar Kwikpen U-100) 100 Unit/Ml (3 Ml) 

Insuln.pen, 65 UNIT SQ BID, (Reported)


   Entered as Reported by: TEDDY ROMAN on 5/12/21 1043


   Last Action: Reviewed


Metoprolol Succinate (Metoprolol Succinate) 50 Mg Tab.er.24h, 50 MG PO DAILY, 

(Reported)


   Entered as Reported by: TEDDY ROMAN on 5/12/21 1043


   Last Action: Reviewed


Montelukast Sodium (Montelukast Sodium) 10 Mg Tablet, 10 MG PO HS, (Reported)


   Entered as Reported by: TEDDY ROMAN on 5/12/21 1043


   Last Action: Reviewed


Omeprazole (Omeprazole) 20 Mg Capsule.dr, 20 MG PO DAILY, (Reported)


   Entered as Reported by: TEDDY ROMAN on 5/12/21 1043


   Last Action: Reviewed


Pregabalin (Pregabalin) 100 Mg Capsule, 100 MG PO 0000,0600,1800, (Reported)


   Entered as Reported by: TEDDY ROMAN on 5/12/21 1043


   Last Action: Reviewed


Rosuvastatin Calcium (Rosuvastatin Calcium) 40 Mg Tablet, 40 MG PO DAILY, 

(Reported)


   Entered as Reported by: TEDDY ROMAN on 5/12/21 1043


   Last Action: Reviewed


Spironolactone (Spironolactone) 25 Mg Tablet, 25 MG PO DAILY, (Reported)


   Entered as Reported by: JULIETA DUEÑAS on 10/1/21 1124


   Last Action: Reviewed


Discontinued Medications


Cyclobenzaprine HCl (Cyclobenzaprine HCl) 10 Mg Tablet, 10 MG PO BID


   Discontinued Reason: No Longer Taking


   Prescribed by: MENDEL OLIVERA on 10/5/21 1055


   Last Action: Discontinued


Diltiazem HCl (Diltiazem 24Hr ER) 120 Mg Cap.er.24h, 120 MG PO DAILY, (Reported)


   Discontinued Reason: Duplicate Order


   Entered as Reported by: JULIETA DUEÑAS on 10/1/21 1124


   Last Action: Discontinued


Doxycycline Hyclate (Doxycycline Hyclate) 100 Mg Tablet, 100 MG PO BID


   Discontinued Reason: No Longer Taking


   Prescribed by: MENDEL OLIVERA on 10/5/21 1055


   Last Action: Discontinued


Fluticasone/Salmeterol (Fluticasone-Salmeterol 113-14) 1 Each Aer.pow.ba, 0 EACH

IH RTBID


   Discontinued Reason: Duplicate Order


   Prescribed by: MENDEL OLIVERA on 10/5/21 1055


   Last Action: Discontinued


Fluticasone/Salmeterol (Advair 250-50 Diskus) 1 Each Blst.w.dev, 1 EACH IH BID


   Discontinued Reason: Duplicate Order


   Prescribed by: MENDEL OLIVERA on 10/5/21 1334


   Last Action: Discontinued


Furosemide (Furosemide) 40 Mg Tablet, 40 MG PO DAILY


   Discontinued Reason: Duplicate Order


   Prescribed by: MENDEL OLIVERA on 10/5/21 1055


   Last Action: Discontinued


Guaifenesin (Mucinex) 600 Mg Tab.er.12h, 600 MG PO BID


   Discontinued Reason: No Longer Taking


   Prescribed by: MENDEL OLIVERA on 10/5/21 1055


   Last Action: Discontinued


Hydrocodone/Acetaminophen (Hydrocodone-Acetamin 7.5-325) 1 Each Tablet, 1 EA PO 

Q6H PRN for PAIN-MODERATE (5-7), (Reported)


   Discontinued Reason: No Longer Taking


   Entered as Reported by: JULIETA DUEÑAS on 10/1/21 1124


   Last Action: Discontinued


Potassium Chloride (Potassium Chloride) 10 Meq Tab.er.prt, 10 MEQ PO DAILY


   Discontinued Reason: No Longer Taking


   Prescribed by: MENDEL OLIVERA on 10/5/21 1055


   Last Action: Discontinued


Prednisone (Prednisone) 10 Mg Tab.ds.pk, 10 MG PO DAILY


   Discontinued Reason: No Longer Taking


   Prescribed by: MENDEL OLIVERA on 10/5/21 1055


   Last Action: Discontinued





Past Medical-Social-Family Hx


Patient Social History


Smoking Status:  Former Smoker (Quit smoking 3 years ago after smoking since he 

was 11. (64 year smoking history))


Type Used:  Smokeless Tobacco


Recent Hopitalizations:  Yes (3 WEEKS AGO ABD ANEURYSM REPAIR)


Alcohol Use?:  No


Have you traveled recently?:  No





Immunizations Up To Date


Date of Influenza Vaccine:  Oct 1, 2020





Surgeries


Surgeries:  Appendectomy





Respiratory


History of Respiratory Disorde:  Yes


Respiratory Disorders:  Emphysema





Cardiovascular


History of Cardiac Disorders:  Yes


Cardiac Disorders:  Atrial Fibrillation, Coronary Artery Disease, High 

Cholesterol, Hypertension





Neurological


History of Neurological Disord:  Yes


Neurological Disorders:  TIA





Genitourinary


History of Genitourinary Disor:  No





Gastrointestinal


History of Gastrointestinal Di:  Yes


Gastrointestinal Disorders:  Gastroesophageal Reflux





Cancer


History of Cancer:  Yes


Cancer:  Skin





Blood Transfusions


History of Blood Disorders:  No





Reviewed Nursing Assessment


Reviewed/Agree w Nursing PMH:  Yes





Family Medical History


Significant Family History:  No Pertinent Family Hx





Review of Systems-General


Constitutional:  No chills, No diaphoresis


EENTM:  No blurred vision, No double vision


Respiratory:  No cough, No dyspnea on exertion


Cardiovascular:  No chest pain, No edema


Gastrointestinal:  abdominal pain; No nausea, No vomiting


Genitourinary:  No decreased output, No discharge


Musculoskeletal:  No back pain, No joint pain


Skin:  No change in color, No change in hair/nails


Psychiatric/Neurological:  Denies Anxiety, Denies Depressed, Denies Emotional 

Problems


All Other Systems Reviewed


Negative Unless Noted:  Yes (Negative excepted noted.)





Physical Exam-General Problems


Physical Exam


Vital Signs





Vital Signs - First Documented








 8/31/22 8/31/22 8/31/22





 11:25 16:58 20:20


 


Temp 36.2  


 


Pulse 76  


 


Resp 20  


 


B/P (MAP) 150/67 (94)  


 


Pulse Ox 95  


 


O2 Delivery Room Air  


 


O2 Flow Rate  2.00 


 


FiO2   28





Capillary Refill : Less Than 3 Seconds


General Appearance:  no apparent distress, obese


HEENT:  PERRL/EOMI, normal ENT inspection


Neck:  non-tender, supple


Respiratory:  chest non-tender, no respiratory distress


Cardiovascular:  regular rate, rhythm, no JVD


Gastrointestinal:  soft, tenderness (minimal more in the epigastric region)


Rectal:  deferred


Back:  normal inspection, no CVA tenderness


Extremities:  normal range of motion, normal inspection


Neurologic/Psychiatric:  alert, normal mood/affect, oriented x 3


Skin:  warm/dry, pallor


Lymphatic:  no adenopathy





Data Review


Labs


Laboratory Tests


8/31/22 20:07: Glucometer 273H


9/1/22 05:00: 


White Blood Count 10.6, Red Blood Count 3.83L, Hemoglobin 5.9*L, Hematocrit 23L,

Mean Corpuscular Volume 60L, Mean Corpuscular Hemoglobin 15L, Mean Corpuscular 

Hemoglobin Concent 26L, Red Cell Distribution Width 20.7H, Platelet Count 381, 

Mean Platelet Volume 10.0, Immature Granulocyte % (Auto) 2, Neutrophils (%) 

(Auto) 70, Lymphocytes (%) (Auto) 17, Monocytes (%) (Auto) 7, Eosinophils (%) 

(Auto) 4, Basophils (%) (Auto) 1, Neutrophils # (Auto) 7.4, Lymphocytes # (Auto)

1.7, Monocytes # (Auto) 0.8, Eosinophils # (Auto) 0.4H, Basophils # (Auto) 0.1, 

Immature Granulocyte # (Auto) 0.2H, Sodium Level 137, Potassium Level 3.9, 

Chloride Level 104, Carbon Dioxide Level 24, Anion Gap 9, Blood Urea Nitrogen 

15, Creatinine 1.02, Estimat Glomerular Filtration Rate 76, BUN/Creatinine Ratio

15, Glucose Level 120H, Calcium Level 8.6, Corrected Calcium 9.2, Total 

Bilirubin 0.6, Aspartate Amino Transf (AST/SGOT) 20, Alanine Aminotransferase 

(ALT/SGPT) 11, Alkaline Phosphatase 65, Total Protein 6.3L, Albumin 3.3, 

Triglycerides Level 74, Cholesterol Level 104, LDL Cholesterol Direct 68, VLDL 

Cholesterol 15, HDL Cholesterol 26L


9/1/22 05:08: Glucometer 134H


9/1/22 11:32: Glucometer 161H


9/1/22 15:01: 


Hemoglobin 6.9*L, Hematocrit 26L


9/1/22 15:33: Glucometer 128H





Assessment/Plan


anemia


long term use antiplatelet/anticoagulation


history of endoleak from aortic/iliac stenting


abdominal pain lower abdomen and epigastric area








hold antiplatelet/anticoagulation


transfuse prbc as needed and follow


Protonix in case ulcer cause of bleeding


discussed with Dr. Stearns and would like to have EGD tomorrow.


Patient discussed risks and benefits and to be scheduled


NPO


If no source will need colonoscopy inpatient vs outpatient  would like to hold 

antiplatelets though 5 days if needed.





Clinical Quality Measures


DVT/VTE Prophylaxis Comfirm.Dx


Pharmacological not ordered:  Aneurysm, Active bleeding (Possible active bleed 

of unknown origin), Risk of Bleeding











MERCED RYAN DO               Sep 1, 2022 18:04

## 2022-09-01 NOTE — HISTORY & PHYSICAL
KAYCEE HOUGH 9/1/22 1152:


HPI


History of Present Illness:


Patient is a 76 yo male admitted for anemia after visiting the ED for persistent

sore throat. Sore throat started a couple weeks ago after a sinus infection and 

has progressed in severity with painful and difficult swallowing. Complains of 

nausea w/o vomiting, intermittent headaches, orthostatic hypotension, and 

diarrhea that started last night w/o blood or melena. Patient states that Dr. Rivera told him that he had an aneurysmal bleed.


Source:  patient


Exam Limitations:  no limitations


Date seen by provider:  Sep 1, 2022


Time Seen by Provider:  08:15


Attending Physician


Luis Mcpherson MD


PCP


Admitting Physician:


Juliano Davis MD 








Attending Physician:


Juliano Davis MD


Consult





Date of Admission


Aug 31, 2022 at 17:42





Home Medications


Home Medications


Reviewed patient Home Medication Reconciliation performed by pharmacy medication

reconciliations technician and/or nursing.


Patients Allergies have been reviewed.





Allergies


Coded Allergies:  


     lisinopril (Verified  Allergy, Mild, 5/12/21)


   MUSCLE ACHES


     losartan (Verified  Allergy, Mild, Rash, 5/12/21)


     lovastatin (Verified  Allergy, Mild, 5/12/21)


   MUSCLE ACHES


     rosiglitazone (Verified  Allergy, Mild, Hives, 5/12/21)


     umeclidinium (Verified  Allergy, Mild, Rash, 5/12/21)





PMH-Social-Family Hx


Patient Social History


Marrital Status:  


Employed/Student:  retired


Smoking Status:  Former Smoker (Quit smoking 3 years ago after smoking since he 

was 11. (63 year smoking history))


Recent Hopitalizations:  Yes (3 WEEKS AGO ABD ANEURYSM REPAIR)


Alcohol Use?:  No


Tobacco type used:  Cigarettes


Have you traveled recently?:  No





Immunizations Up To Date


Influenza Vaccine Up-to-Date:  Yes; Up-to-Date


First/Initial COVID19 Vaccinat:  APRIL 2021


Second COVID19 Vaccination Alberto:  MAY 2021





Past Medical History





Med Hx:


Atrial fibrillation


Congestive Heart Failure


Emphysema


COPD


Diabetes Mellitus





Surg Hx:


Multiple aortic repairs


Cardiac Stent Placement 3x


Appendectomy (1954)





Review of Systems ([facility])


Constitutional:  see HPI; No chills; dizziness (When standing)


EENTM:  hoarseness, throat pain, other (Painful swallowing)


Respiratory:  no symptoms reported, see HPI, cough


Cardiovascular:  see HPI, Hx of Intervention, vascular heart diseas


Gastrointestinal:  see HPI; No abdominal pain, No constipation; diarrhea 

(Started yesterday), dysphagia; No melena; nausea; No vomiting


Genitourinary:  no symptoms reported


Musculoskeletal:  no symptoms reported


Skin:  no symptoms reported


Psychiatric/Neurological:  No Symptoms Reported





Reviewed Test Results


Reviewed Test Results


Lab


RBC: 3.83 down from 3.96   HGB: 5.9 down from 6.3   HCT: 23   MCV: 60   MCH: 15

   MCHC: 26   RDW: 20.7


Radiology


Date of Exam:08/31/22





CT NECK (SOFT TISSUE) W








PROCEDURE: CT neck soft tissue with contrast.





TECHNIQUE: Multiple contiguous axial images were obtained through


the neck after the administration of contrast. Auto Exposure


Controls were utilized during the CT exam to meet ALARA standards


for radiation dose reduction. 





INDICATION:  Sore throat. Swelling of the tongue and jaw.





COMPARISON: None.





Findings:


The posterior nasopharynx and oropharynx demonstrate appropriate


symmetry. The patient is edentulous. There is no displacement of


the parapharyngeal fat planes. There is no abnormal process


evident within the prevertebral or retropharyngeal space. There


is no evidence of abnormal thickening of the epiglottis or


aryepiglottic folds. The vocal folds appear symmetric.





The parotid, submandibular and thyroid gland are unremarkable.





No pathologically enlarged cervical lymph nodes are evident. No


focal inflammatory changes are demonstrated. No soft tissue mass


or fluid collection demonstrated.





The vascular structures the neck demonstrate no evidence of


high-grade stenosis on this nondedicated exam. The visualized


lung apices are clear.





The visualized intracranial contents demonstrate no evidence of


pathologic intracranial enhancement or intracranial mass effect.


Visualized orbital contents are unremarkable. Retained secretions


and mucosal thickening are seen throughout the left maxillary


sinus. The mastoids and middle ears are clear.





No acute osseous abnormality in the cervical spine.





Impression:


1. Appropriate symmetry of the aerodigestive tract.


2. No evidence of pathologic adenopathy.


3. Sinusitis involving the left maxillary sinus.

















Dictated by: 





  Dictated on workstation # [id number]








Dict:   08/31/22 1332


Trans:   08/31/22 1342


CVB [id number]





Interpreted by:     AMPARO LATHAM DO


Electronically signed by: AMPARO LATHAM DO 08/31/22 1340





Abd/pelvic CTA has also been conducted, interpretation not yet available.





Physical Exam-([facility])


Physical Exam


Vital Signs





                          VS - Last 72 Hours, by Label








 8/31/22 8/31/22 8/31/22 8/31/22





 11:25 16:58 18:10 18:15


 


Temp 36.2  36.5 


 


Pulse 76 75 87 


 


Resp 20 16 22 


 


B/P (MAP) 150/67 (94) 102/44 140/63 (88) 


 


Pulse Ox 95 98 97 


 


O2 Delivery Room Air Nasal Cannula Nasal Cannula Nasal Cannula


 


O2 Flow Rate  2.00 2.00 2.00


 


    





 8/31/22 8/31/22 8/31/22 8/31/22





 18:50 19:00 19:08 19:15


 


Temp   36.9 


 


Pulse 71 70 70 


 


Resp   20 


 


B/P (MAP)   130/60 (83) 


 


Pulse Ox   98 97


 


O2 Delivery   Nasal Cannula Nasal Cannula


 


O2 Flow Rate   2.00 2.00


 


    





 8/31/22 9/1/22 9/1/22 9/1/22





 20:20 00:12 01:00 03:40


 


Temp  37.1  35.8


 


Pulse  83 78 73


 


Resp  20  20


 


B/P (MAP)  135/60 (85)  132/59 (83)


 


Pulse Ox 97 93  94


 


O2 Delivery  Nasal Cannula  Room Air


 


O2 Flow Rate  2.00  


 


FiO2 28   


 


    





 9/1/22 9/1/22 9/1/22 9/1/22





 06:55 07:45 08:14 08:30


 


Temp  36.6 36.6 36.5


 


Pulse 96 75 75 80


 


Resp  20 20 20


 


B/P (MAP)  122/57 (78) 122/57 132/70


 


Pulse Ox  96 96 98


 


O2 Delivery  Room Air Room Air Room Air


 


    





 9/1/22   





 10:51   


 


O2 Delivery Room Air   





Capillary Refill : Less Than 3 Seconds


Temperature (Fahrenheit):  97.8


General Appearance:  WD/WN, no apparent distress, obese


Neck:  supple, normal inspection


Respiratory:  chest non-tender, lungs clear, no respiratory distress, no 

accessory muscle use, decreased breath sounds


Cardiovascular:  regular rate, rhythm, no edema, no gallop, no JVD, no murmur, 

other (Diminished heart sounds)


Gastrointestinal:  normal bowel sounds, non tender, soft, no organomegaly, no 

pulsatile mass


Back:  normal inspection


Extremities:  normal range of motion, normal inspection


Neurologic/Psychiatric:  no motor/sensory deficits, alert, normal mood/affect, 

oriented x 3


Skin:  normal color, warm/dry





Assessment/Plan


Assessment/Plan


Admission Dx


Anemia


Admission Status:  Observation


Assessment & Plan


Anemia - Blood transfusion (leukocyte reduced RBC) 1 unit. Dr. Mcneil is waiting

for post-transfusion labs to decide whether a scope is necessary as the patient 

took eliquis and plavix the previous morning.


Sore throat - patient was prescribed clindamycin


Atrial fibrillation - hold eliquis and plavix until anemia resolves


Congestive Heart Failure - continue medical management


COPD - fluticasone/salmeterol


Emphysema - continue medical management, supplemental oxygen


Diabetes mellitus - Insulin aspart and glargine, close glucose monitoring





Clinical Quality Measures


End of Life/Advance Care Plan:


Advance Care discuss with:  patient (Patient wishes to be changed from DNR 

status to full code)





Admission Status


Admission Dx


Anemia


Admission Status:  Observation





DVT/VTE Prophylaxis Comfirm.Dx


Pharmacological not ordered:  Aneurysm, Active bleeding (Possible active bleed 

of unknown origin), Risk of Bleeding





JULIANO DAVIS MD 9/1/22 1357:


Home Medications


Allergies


Coded Allergies:  


     lisinopril (Verified  Allergy, Mild, 5/12/21)


   MUSCLE ACHES


     losartan (Verified  Allergy, Mild, Rash, 5/12/21)


     lovastatin (Verified  Allergy, Mild, 5/12/21)


   MUSCLE ACHES


     rosiglitazone (Verified  Allergy, Mild, Hives, 5/12/21)


     umeclidinium (Verified  Allergy, Mild, Rash, 5/12/21)





Review of Systems ([facility])


Constitutional:  No chills; dizziness (When standing), malaise


EENTM:  hoarseness, throat pain, other (Painful swallowing); No nose congestion,

No nose pain


Respiratory:  No cough; dyspnea on exertion (with any activity), short of breath


Cardiovascular:  No chest pain, No palpitations


Genitourinary:  no symptoms reported; No dysuria, No frequency, No hematuria


Musculoskeletal:  no symptoms reported; No back pain, No joint pain, No muscle 

pain


Skin:  no symptoms reported


Psychiatric/Neurological:  No Symptoms Reported





Reviewed Test Results


Reviewed Test Results


Lab





Laboratory Tests








Test


 8/31/22


20:07 9/1/22


05:00 9/1/22


05:08 9/1/22


11:32 Range/Units


 


 


Glucometer 273 H  134 H 161 H   MG/DL


 


White Blood Count


 


 10.6 


 


 


 4.3-11.0


10^3/uL


 


Red Blood Count


 


 3.83 L


 


 


 4.30-5.52


10^6/uL


 


Hemoglobin  5.9 *L   13.3-17.7  g/dL


 


Hematocrit  23 L   40-54  %


 


Mean Corpuscular Volume  60 L   80-99  fL


 


Mean Corpuscular Hemoglobin  15 L   25-34  pg


 


Mean Corpuscular Hemoglobin


Concent 


 26 L


 


 


 32-36  g/dL





 


Red Cell Distribution Width  20.7 H   10.0-14.5  %


 


Platelet Count


 


 381 


 


 


 130-400


10^3/uL


 


Mean Platelet Volume  10.0    9.0-12.2  fL


 


Immature Granulocyte % (Auto)  2     %


 


Neutrophils (%) (Auto)  70    42-75  %


 


Lymphocytes (%) (Auto)  17    12-44  %


 


Monocytes (%) (Auto)  7    0-12  %


 


Eosinophils (%) (Auto)  4    0-10  %


 


Basophils (%) (Auto)  1    0-10  %


 


Neutrophils # (Auto)


 


 7.4 


 


 


 1.8-7.8


10^3/uL


 


Lymphocytes # (Auto)


 


 1.7 


 


 


 1.0-4.0


10^3/uL


 


Monocytes # (Auto)


 


 0.8 


 


 


 0.0-1.0


10^3/uL


 


Eosinophils # (Auto)


 


 0.4 H


 


 


 0.0-0.3


10^3/uL


 


Basophils # (Auto)


 


 0.1 


 


 


 0.0-0.1


10^3/uL


 


Immature Granulocyte # (Auto)


 


 0.2 H


 


 


 0.0-0.1


10^3/uL


 


Sodium Level  137    135-145  MMOL/L


 


Potassium Level  3.9    3.6-5.0  MMOL/L


 


Chloride Level  104      MMOL/L


 


Carbon Dioxide Level  24    21-32  MMOL/L


 


Anion Gap  9    5-14  MMOL/L


 


Blood Urea Nitrogen  15    7-18  MG/DL


 


Creatinine


 


 1.02 


 


 


 0.60-1.30


MG/DL


 


Estimat Glomerular Filtration


Rate 


 76 


 


 


  





 


BUN/Creatinine Ratio  15     


 


Glucose Level  120 H     MG/DL


 


Calcium Level  8.6    8.5-10.1  MG/DL


 


Corrected Calcium  9.2    8.5-10.1  MG/DL


 


Total Bilirubin  0.6    0.1-1.0  MG/DL


 


Aspartate Amino Transf


(AST/SGOT) 


 20 


 


 


 5-34  U/L





 


Alanine Aminotransferase


(ALT/SGPT) 


 11 


 


 


 0-55  U/L





 


Alkaline Phosphatase  65      U/L


 


Total Protein  6.3 L   6.4-8.2  GM/DL


 


Albumin  3.3    3.2-4.5  GM/DL


 


Triglycerides Level  74    <150  MG/DL


 


Cholesterol Level  104    < 200  MG/DL


 


LDL Cholesterol Direct  68    1-129  MG/DL


 


VLDL Cholesterol  15    5-40  MG/DL


 


HDL Cholesterol  26 L   40-60  MG/DL











Physical Exam-([facility])


Physical Exam


General Appearance:  WD/WN, no apparent distress


HEENT:  PERRL/EOMI


Neck:  non-tender, supple, normal inspection


Respiratory:  no respiratory distress, no accessory muscle use, wheezing


Cardiovascular:  normal peripheral pulses, regular rate, rhythm, no murmur


Gastrointestinal:  normal bowel sounds, non tender, soft


Back:  no CVA tenderness, no vertebral tenderness


Extremities:  normal range of motion, non-tender, normal inspection, no calf 

tenderness, normal capillary refill


Neurologic/Psychiatric:  CNs II-XII nml as tested, no motor/sensory deficits, 

alert, normal mood/affect, oriented x 3


Skin:  normal color, warm/dry


Lymphatic:  no adenopathy





Supervisory-Addendum Brief


Verification & Attestation


Participated in pt care:  history, physical


Personally performed:  exam, history


Care discussed with:  Medical Student


Procedures:  n/a


Verification and Attestation of Medical Student E/M Service





A medical student performed and documented this service in my presence. I 

reviewed and verified all information documented by the medical student and made

modifications to such information, when appropriate. I personally performed the 

physical exam and medical decision making. 





 Juliano Davis, Sep 1, 2022,14:21


 


Chest Pain/Angina


   - Likely 2/2 to severe anemia, cardiology consult, appreciate recommendations





Severe Anemia, unsure of cause


   - Holding OAC, Occult stool pending, Consult general surgery, 1 unit pRBCs 

given, repeat H/H this afternoon





A fib rate controlled, on OAC


   - OAC on hold





IDDM


   - Continue home insulin, SSI, Accuchecks





HTN


   - holding BPs meds due to severe anemia





COPD


   - Continue home meds





h/o TIA/CVA











KAYCEE HOUGH                 Sep 1, 2022 11:52


JULIANO DAVIS MD                Sep 1, 2022 13:57

## 2022-09-02 VITALS — DIASTOLIC BLOOD PRESSURE: 78 MMHG | SYSTOLIC BLOOD PRESSURE: 164 MMHG

## 2022-09-02 VITALS — SYSTOLIC BLOOD PRESSURE: 163 MMHG | DIASTOLIC BLOOD PRESSURE: 76 MMHG

## 2022-09-02 VITALS — DIASTOLIC BLOOD PRESSURE: 74 MMHG | SYSTOLIC BLOOD PRESSURE: 144 MMHG

## 2022-09-02 VITALS — DIASTOLIC BLOOD PRESSURE: 77 MMHG | SYSTOLIC BLOOD PRESSURE: 146 MMHG

## 2022-09-02 VITALS — SYSTOLIC BLOOD PRESSURE: 160 MMHG | DIASTOLIC BLOOD PRESSURE: 82 MMHG

## 2022-09-02 VITALS — DIASTOLIC BLOOD PRESSURE: 63 MMHG | SYSTOLIC BLOOD PRESSURE: 144 MMHG

## 2022-09-02 VITALS — DIASTOLIC BLOOD PRESSURE: 70 MMHG | SYSTOLIC BLOOD PRESSURE: 147 MMHG

## 2022-09-02 VITALS — SYSTOLIC BLOOD PRESSURE: 136 MMHG | DIASTOLIC BLOOD PRESSURE: 64 MMHG

## 2022-09-02 LAB
ALBUMIN SERPL-MCNC: 3.6 GM/DL (ref 3.2–4.5)
ALP SERPL-CCNC: 63 U/L (ref 40–136)
ALT SERPL-CCNC: 16 U/L (ref 0–55)
BASOPHILS # BLD AUTO: 0.1 10^3/UL (ref 0–0.1)
BASOPHILS NFR BLD AUTO: 1 % (ref 0–10)
BILIRUB SERPL-MCNC: 0.7 MG/DL (ref 0.1–1)
BUN/CREAT SERPL: 10
CALCIUM SERPL-MCNC: 9.1 MG/DL (ref 8.5–10.1)
CHLORIDE SERPL-SCNC: 105 MMOL/L (ref 98–107)
CO2 SERPL-SCNC: 25 MMOL/L (ref 21–32)
CREAT SERPL-MCNC: 1.01 MG/DL (ref 0.6–1.3)
EOSINOPHIL # BLD AUTO: 0.5 10^3/UL (ref 0–0.3)
EOSINOPHIL NFR BLD AUTO: 6 % (ref 0–10)
GFR SERPLBLD BASED ON 1.73 SQ M-ARVRAT: 77 ML/MIN
GLUCOSE SERPL-MCNC: 147 MG/DL (ref 70–105)
HCT VFR BLD CALC: 27 % (ref 40–54)
HCT VFR BLD CALC: 29 % (ref 40–54)
HGB BLD-MCNC: 7.4 G/DL (ref 13.3–17.7)
HGB BLD-MCNC: 7.9 G/DL (ref 13.3–17.7)
LYMPHOCYTES # BLD AUTO: 1.9 10^3/UL (ref 1–4)
LYMPHOCYTES NFR BLD AUTO: 23 % (ref 12–44)
MANUAL DIFFERENTIAL PERFORMED BLD QL: NO
MCH RBC QN AUTO: 17 PG (ref 25–34)
MCHC RBC AUTO-ENTMCNC: 27 G/DL (ref 32–36)
MCV RBC AUTO: 64 FL (ref 80–99)
MONOCYTES # BLD AUTO: 0.8 10^3/UL (ref 0–1)
MONOCYTES NFR BLD AUTO: 9 % (ref 0–12)
NEUTROPHILS # BLD AUTO: 4.7 10^3/UL (ref 1.8–7.8)
NEUTROPHILS NFR BLD AUTO: 59 % (ref 42–75)
PLATELET # BLD: 403 10^3/UL (ref 130–400)
PMV BLD AUTO: 9.8 FL (ref 9–12.2)
POTASSIUM SERPL-SCNC: 3.9 MMOL/L (ref 3.6–5)
PROT SERPL-MCNC: 6.9 GM/DL (ref 6.4–8.2)
SODIUM SERPL-SCNC: 139 MMOL/L (ref 135–145)
WBC # BLD AUTO: 8 10^3/UL (ref 4.3–11)

## 2022-09-02 PROCEDURE — 0DB48ZX EXCISION OF ESOPHAGOGASTRIC JUNCTION, VIA NATURAL OR ARTIFICIAL OPENING ENDOSCOPIC, DIAGNOSTIC: ICD-10-PCS | Performed by: SURGERY

## 2022-09-02 PROCEDURE — 0DB78ZX EXCISION OF STOMACH, PYLORUS, VIA NATURAL OR ARTIFICIAL OPENING ENDOSCOPIC, DIAGNOSTIC: ICD-10-PCS | Performed by: SURGERY

## 2022-09-02 RX ADMIN — SUCRALFATE SCH GM: 1 TABLET ORAL at 20:32

## 2022-09-02 RX ADMIN — PANTOPRAZOLE SODIUM SCH MG: 40 INJECTION, POWDER, FOR SOLUTION INTRAVENOUS at 20:32

## 2022-09-02 RX ADMIN — PANTOPRAZOLE SODIUM SCH MG: 40 INJECTION, POWDER, FOR SOLUTION INTRAVENOUS at 08:50

## 2022-09-02 RX ADMIN — SUCRALFATE SCH GM: 1 TABLET ORAL at 17:21

## 2022-09-02 NOTE — PROGRESS NOTE-POST OPERATIVE
Post-Operative Progess Note


Surgeon (s)/Assistant (s)


Surgeon


MERCED RYAN DO


Assistant:  na





Pre-Operative Diagnosis


anemia, epigastric pain





Post-Operative Diagnosis





small hiatal hernia, erosive esophagitis, gastritis





Procedure & Operative Findings


Date of Procedure


9/2/22


Procedure Performed/Findings


egd c biopsies


Anesthesia Type


per crna





Estimated Blood Loss


Estimated blood loss (mL):  none





Specimens/Packing


Specimens Removed


antrum, ge











MERCED RYAN DO               Sep 2, 2022 15:53

## 2022-09-02 NOTE — CARDIOLOGY PROGRESS NOTE
Subjective


Date Seen by Provider:  Sep 2, 2022


Time Seen by Provider:  08:57


Subjective/Events-last exam


Patient is sitting in bed, feeling well.


Still having lower abdominal pain.


Review of Systems


General:  No Chills, No Night Sweats; Fatigue; No Malaise, No Appetite, No Other


HEENT:  No Head Aches, No Visual Changes, No Eye Pain, No Ear Pain, No 

Dysphasia, No Sinus Congestion, No Post Nasal Drip, No Sore Throat, No Other


Pulmonary:  No Dyspnea, No Cough, No Pleuritic Chest Pain, No Other


Cardiovascular:  No: Chest Pain, Palpitations, Orthopnea, Paroxysmal Noc. 

Dyspnea, Edema, Lt Headedness, Other





Objective-Cardiology


Exam


Last Set of Vital Signs





Vital Signs








 8/31/22 9/2/22





 20:20 08:42


 


Temp  35.2


 


Pulse  67


 


Resp  19


 


B/P (MAP)  147/70 (95)


 


Pulse Ox  98


 


O2 Delivery  Nasal Cannula


 


O2 Flow Rate  2.00


 


FiO2 28 








I&O











Intake and Output 


 


 9/2/22





 00:00


 


Intake Total 1910 ml


 


Output Total 3110 ml


 


Balance -1200 ml


 


 


 


Intake Oral 1910 ml


 


Output Urine Total 3110 ml








General:  Alert, Oriented X3, Cooperative


HEENT:  Atraumatic, PERRLA


Neck:  Supple, No JVD, No Thyromegaly


Lungs:  Clear to Auscultation, Normal Air Movement


Heart:  Regular Rate, Normal S1, Normal S2, Other (Systolic murmur at the left 

sternal border)


Abdomen:  Normal Bowel Sounds, Soft, No Tenderness, No Hepatosplenomegaly, No 

Masses


Extremities:  No Clubbing, No Cyanosis, No Edema, Normal Pulses, No 

Tenderness/Swelling


Skin:  No Rashes, No Breakdown, No Significant Lesion


Neuro:  Normal Gait, Normal Speech, Strength at 5/5 X4 Ext, Normal Tone, 

Sensation Intact


Psych/Mental Status:  Mental Status NL, Mood NL





Results


Lab


Laboratory Tests


9/1/22 15:01








9/2/22 01:05








9/2/22 05:42











A/P-Cardiology


Admission Diagnosis


Chest pain


Anemia


Coronary artery disease


Paroxysmal atrial fibrillation





Assessment/Plan


Chest pain, unstable angina, most probably secondary to coronary artery disease 

and severe anemia.


Feeling better at this point, reporting improvement in chest pain.





Anemia, GI bleed, stool for occult blood is positive


Discussed with Dr. Perez and recommended endoscopy


Received blood transfusion, continue to monitor H&H





Paroxysmal atrial fibrillation, h/o failed cardioversion in May 2021, had 

another cardioversion on 6.2.21 by Dr. Gardner at [facility], Maintained on Eliquis, 

Cardizem CD, Multaq.


I will hold Eliquis for now and monitor





[id number] score of 6, yearly risk of stroke without oral anticoagulation is 

over 6 percent, holding Eliquis and monitor





Coronary artery disease, history of multiple interventions in the past, 

reporting that he had multiple stents done at [facility] in the remote past 

and he had under deployed stent.


Has been on aspirin and Plavix, currently on hold.





Abdominal aortic aneurysm, workup showed that the aneurysm measured 5 cm in the 

infrarenal abdominal aorta, he has bilateral iliac aneurysm, s/p AAA repair done

by Dr. Rivera in April 2021.


Discussed with Dr. Perez, will evaluate CT of the abdomen and pelvis.  Patient 

reportedly in the lower extremity most probably around the aneurysm in the iliac

artery





Hypertension, restart home medication monitor blood pressure





Hyperlipidemia, hypertriglyceridemia, maintained on fenofibrate and 

rosuvastatin.


I will evaluate lipid profile





Diabetes mellitus, followed and managed by primary care physician





COPD/KENROY, was intolerant to C Pap machine. Hospitalized last month for AE COPD. 

Will refer to pulmonologist for further evaluation.





History of multiple TIAs/CVA, maintained on Plavix and Eliquis





Obesity, BMI 37, we discussed weight loss.





Tobaccoism, stopped smoking 2 years ago, encouraged to continue with smoking 

cessation.











HAILEY ZEE MD               Sep 2, 2022 08:58

## 2022-09-02 NOTE — PROGRESS NOTE - HOSPITALIST
FLORES OLMOS 9/2/22 1424:


Subjective


HPI/CC On Admission


Date Seen by Provider:  Sep 2, 2022


Time Seen by Provider:  14:13


Subjective/Events-last exam


Patient is a 77 year old male in with blood loss anemia. His hemoglobin as of 

today is 7.9 which is up from 6.9 on admission for which he received a 

transfusion. He was positive for stool occult blood, possibly due to past 

surgical history of abdominal aorta aneurysm stent placement (CT on 9-1-2022 

showed small leak in aneurysm sac). He has mild epigastic pain for the last 

couple days, which he said was improved when given protonix. He also complains 

of diffuse stomach pain and his swollen tongue. He has not been sleeping well, 

and bowel movements are still diarrhea. He is awaiting decision on having a 

possible endoscopy today depending upon his hemoglobin levels. Per Dr. Perez, 

if hemoglobin is stable they will postpone EGD for five days, if hemoglobin does

not stabilize they will do EGD today.





Objective


Exam


Vital Signs





Vital Signs








  Date Time  Temp Pulse Resp B/P (MAP) Pulse Ox O2 Delivery O2 Flow Rate FiO2


 


9/2/22 12:41  72      


 


9/2/22 12:15 35.7  18 160/82 (108) 98 Room Air  


 


9/2/22 09:00       2.00 


 


8/31/22 20:20        28





Capillary Refill : Less Than 3 Seconds


General Appearance:  No Apparent Distress, WD/WN; No Anxious, No Chronically ill


HEENT:  Moist Mucous Membranes


Neck:  Full Range of Motion, Supple


Respiratory:  Chest Non Tender, Lungs Clear, Normal Breath Sounds


Cardiovascular:  No Gallop, No JVD, No Murmur


Gastrointestinal:  No No Pulsatile Mass, No Non Tender


Rectal:  Deferred


Back:  No No CVA Tenderness, No No Vertebral Tenderness


Extremity:  Normal Capillary Refill, Normal Inspection


Neurologic/Psychiatric:  Alert, Oriented x3, No Motor/Sensory Deficits


Skin:  Normal Color, Warm/Dry (Dry and exfoliated on both upper extremites)


Lymphatic:  No Adenopathy





Results/Procedures


Lab


Laboratory Tests


9/1/22 15:01








9/2/22 01:05








9/2/22 05:42








Patient resulted labs reviewed.





Assessment/Plan


Assessment and Plan


Assess & Plan/Chief Complaint


Anemia (Positive stool for occult blood)


   Recheck hemoglobin and hematocrit


   Obtain EGD


   Supportive Care


Insulin dependent diabetes mellitus


   Continue at home medications


CHF


   Continue at home medications


COPD


A-Fib


Glossitis


History of abdominal aortic aneurysm with stent placement





Clinical Quality Measures


DVT/VTE Prophylaxis Comfirm.Dx


Pharmacological not ordered:  Aneurysm, Active bleeding (Possible active bleed 

of unknown origin), Risk of Bleeding





LAUREN OLIVERA DO 9/3/22 0611:


Subjective


Subjective/Events-last exam


Pt is doing a lot better


Severe anemia has required transfusion, HgA1C is 7.9 now


Scope will be performed soon


Pt has a history of CHF, COPD, diabetes, hypertension, CAD, and afib





Assessment/Plan


Assessment and Plan


Assess & Plan/Chief Complaint


GIB monitoring





Supervisory-Addendum Brief


Verification & Attestation


Participated in pt care:  history, MDM, physical


Personally performed:  exam, history, MDM, supervision of care


Care discussed with:  Medical Student


Procedures:  n/a


Results interpretation:  Verified all documentation


Verification and Attestation of Medical Student E/M Service





A medical student performed and documented this service in my presence. I 

reviewed and verified all information documented by the medical student and made

modifications to such information, when appropriate. I personally performed the 

physical exam and medical decision making. 





 Lauren Olivera Sep 3, 2022,06:10











FLORES OLMOS                 Sep 2, 2022 14:24


LAUREN OLIVERA DO                 Sep 3, 2022 06:11

## 2022-09-02 NOTE — PROGRESS NOTE - SURGERY
MERRITT AHN 9/2/22 0740:


Subjective


Date Seen by a Provider:  Sep 2, 2022


Time Seen by a Provider:  07:37


Subjective/Events-last exam


Pt is awake and alert this morning. He reports having abdominal pain and had 

tenderness in the epigastric area on palpation.Patient's tongue is swollen, he 

reports that this started about ten days ago.


Review of Systems


General:  No Chills, No Night Sweats


HEENT:  No Head Aches, No Visual Changes


Pulmonary:  No Dyspnea, No Cough


Cardiovascular:  No: Chest Pain, Palpitations


Gastrointestinal:  No: Nausea, Vomiting


Genitourinary:  No Dysuria, No Frequency


Musculoskeletal:  No: neck pain, shoulder pain


Neurological:  Weakness, Numbness





Objective


Exam





Vital Signs








  Date Time  Temp Pulse Resp B/P (MAP) Pulse Ox O2 Delivery O2 Flow Rate FiO2


 


9/2/22 03:38 35.6 70 20 136/64 (88) 96 Nasal Cannula 2.00 


 


9/2/22 01:00  74      


 


9/1/22 23:13 36.2 84 20 158/73 96 Room Air  


 


9/1/22 22:55 36.2 75 20 135/63 95 Room Air  


 


9/1/22 20:21 35.8 81 20 137/71 98 Nasal Cannula 2.00 


 


9/1/22 19:57 35.8 81 20 143/79 100 Nasal Cannula 2.00 


 


9/1/22 19:17 35.3 80 20 140/69 (92) 97 Room Air  


 


9/1/22 19:00  84      


 


9/1/22 15:31 36.3 77 20 132/62 (85) 99 Nasal Cannula 2.00 


 


9/1/22 12:04 36.5 78 18 146/77 (100) 98 Room Air  


 


9/1/22 10:51      Room Air  


 


9/1/22 08:30 36.5 80 20 132/70 98 Room Air  


 


9/1/22 08:14 36.6 75 20 122/57 96 Room Air  


 


9/1/22 07:45 36.6 75 20 122/57 (78) 96 Room Air  














I & O 


 


 9/2/22





 07:00


 


Intake Total 1610 ml


 


Output Total 3035 ml


 


Balance -1425 ml





Capillary Refill : Less Than 3 Seconds


General Appearance:  No Apparent Distress, WD/WN


HEENT:  PERRL/EOMI, TMs Normal, Normal ENT Inspection, Pharynx Normal, Moist 

Mucous Membranes


Neck:  Full Range of Motion, Normal Inspection, Non Tender, Supple, Carotid 

Bruit


Respiratory:  Chest Non Tender, Lungs Clear, Normal Breath Sounds, No Accessory 

Muscle Use, No Respiratory Distress


Cardiovascular:  Regular Rate, Rhythm, No Edema, No JVD, Normal Peripheral 

Pulses, Systolic Murmur, Gallop/S3


Gastrointestinal:  soft, tenderness (minimal more in the epigastric region)


Extremity:  Normal Capillary Refill, Normal Inspection, Normal Range of Motion, 

Non Tender, No Calf Tenderness, No Pedal Edema


Neurologic/Psychiatric:  Alert, Oriented x3, No Motor/Sensory Deficits, Normal 

Mood/Affect


Skin:  Warm/Dry, Pallor


Lymphatic:  No Adenopathy





Results


Lab


Laboratory Tests


9/1/22 11:32: Glucometer 161H


9/1/22 15:01: 


Hemoglobin 6.9*L, Hematocrit 26L


9/1/22 15:33: Glucometer 128H


9/1/22 19:24: Glucometer 295H


9/1/22 20:31: 


Urine Color YELLOW, Urine Clarity CLEAR, Urine pH 5.5, Urine Specific Gravity 

<=1.005, Urine Protein NEGATIVE, Urine Glucose (UA) 3+H, Urine Ketones NEGATIVE,

Urine Nitrite NEGATIVE, Urine Bilirubin NEGATIVE, Urine Urobilinogen 0.2, Urine 

Leukocyte Esterase NEGATIVE, Urine RBC (Auto) NEGATIVE, Urine RBC NONE, Urine 

WBC NONE, Urine Squamous Epithelial Cells NONE, Urine Crystals NONE, Urine 

Bacteria NEGATIVE, Urine Casts NONE, Urine Mucus NEGATIVE, Urine Culture 

Indicated NO


9/1/22 22:04: Glucometer 226H


9/1/22 23:10: Stool Occult Blood Immunoassay POSITIVE


9/2/22 01:05: 


Hemoglobin 7.4L, Hematocrit 27L


9/2/22 05:42: 


White Blood Count 8.0, Red Blood Count 4.57, Hemoglobin 7.9L, Hematocrit 29L, 

Mean Corpuscular Volume 64L, Mean Corpuscular Hemoglobin 17L, Mean Corpuscular 

Hemoglobin Concent 27L, Red Cell Distribution Width 23.8H, Platelet Count 403H, 

Mean Platelet Volume 9.8, Immature Granulocyte % (Auto) 2, Neutrophils (%) 

(Auto) 59, Lymphocytes (%) (Auto) 23, Monocytes (%) (Auto) 9, Eosinophils (%) 

(Auto) 6, Basophils (%) (Auto) 1, Neutrophils # (Auto) 4.7, Lymphocytes # (Auto)

1.9, Monocytes # (Auto) 0.8, Eosinophils # (Auto) 0.5H, Basophils # (Auto) 0.1, 

Immature Granulocyte # (Auto) 0.2H, Percent Immature Platelet Fraction 3.7, 

Sodium Level 139, Potassium Level 3.9, Chloride Level 105, Carbon Dioxide Level 

25, Anion Gap 9, Blood Urea Nitrogen 10, Creatinine 1.01, Estimat Glomerular 

Filtration Rate 77, BUN/Creatinine Ratio 10, Glucose Level 147H, Calcium Level 

9.1, Corrected Calcium 9.4, Total Bilirubin 0.7, Aspartate Amino Transf 

(AST/SGOT) 26, Alanine Aminotransferase (ALT/SGPT) 16, Alkaline Phosphatase 63, 

Total Protein 6.9, Albumin 3.6


9/2/22 06:29: Glucometer 172H





Assessment/Plan


anemia


long term use antiplatelet/anticoagulation


history of endoleak from aortic/iliac stenting


abdominal pain lower abdomen and epigastric area








hold antiplatelet/anticoagulation


transfuse prbc as needed and follow


Protonix in case ulcer cause of bleeding


discussed with Dr. Stearns and would like to have EGD today.


Patient discussed risks and benefits and to be scheduled


NPO


If no source will need colonoscopy inpatient vs outpatient  would like to hold 

antiplatelets though 5 days if needed.





Clinical Quality Measures


DVT/VTE Prophylaxis Comfirm.Dx


Pharmacological not ordered:  Aneurysm, Active bleeding (Possible active bleed 

of unknown origin), Risk of Bleeding





MERCED PEREZ DO 9/2/22 1458:


Subjective


Subjective/Events-last exam


Patient still with epigastric pain.  Hgb up slightly.  Transfused yesterday.  No

other complaints. Denies n/v fever sweats chills shortness of breath or chest 

pain.





Objective


Exam


General Appearance:  No Apparent Distress, WD/WN


HEENT:  PERRL/EOMI, Normal ENT Inspection


Neck:  Non Tender, Supple


Respiratory:  Chest Non Tender, No Accessory Muscle Use, No Respiratory Distress


Cardiovascular:  Regular Rate, Rhythm, No JVD


Gastrointestinal:  soft, tenderness (minimal more in the epigastric region), 

hernia (umbilical)


Extremity:  Normal Inspection, Non Tender


Neurologic/Psychiatric:  Alert, Oriented x3


Skin:  Warm/Dry, Pallor


Lymphatic:  No Adenopathy





Assessment/Plan


anemia


long term use antiplatelet/anticoagulation


history of endoleak from aortic/iliac stenting


abdominal pain lower abdomen and epigastric area








hold antiplatelet/anticoagulation


transfuse prbc as needed and follow


Protonix in case ulcer cause of bleeding


EGD today.


Patient discussed risks and benefits and to be scheduled


NPO


If no source will need colonoscopy inpatient vs outpatient  would like to hold 

antiplatelets though 5 days if needed.





Supervisory-Addendum Brief


Verification & Attestation


Participated in pt care:  history, MDM, physical


Personally performed:  exam, history, MDM, supervision of care


Care discussed with:  Medical Student


Procedures:  n/a


Results interpretation:  Verified all documentation


Verification and Attestation of Medical Student E/M Service





A medical student performed and documented this service in my presence. I 

reviewed and verified all information documented by the medical student and made

modifications to such information, when appropriate. I personally performed the 

physical exam and medical decision making. 





 Merced Perez, Sep 2, 2022,15:00











MERRITT AHN                      Sep 2, 2022 07:40


MERCED PEREZ DO               Sep 2, 2022 14:58

## 2022-09-03 VITALS — SYSTOLIC BLOOD PRESSURE: 112 MMHG | DIASTOLIC BLOOD PRESSURE: 64 MMHG

## 2022-09-03 VITALS — SYSTOLIC BLOOD PRESSURE: 144 MMHG | DIASTOLIC BLOOD PRESSURE: 63 MMHG

## 2022-09-03 VITALS — DIASTOLIC BLOOD PRESSURE: 74 MMHG | SYSTOLIC BLOOD PRESSURE: 149 MMHG

## 2022-09-03 VITALS — SYSTOLIC BLOOD PRESSURE: 154 MMHG | DIASTOLIC BLOOD PRESSURE: 76 MMHG

## 2022-09-03 VITALS — SYSTOLIC BLOOD PRESSURE: 149 MMHG | DIASTOLIC BLOOD PRESSURE: 74 MMHG

## 2022-09-03 LAB
ALBUMIN SERPL-MCNC: 3.4 GM/DL (ref 3.2–4.5)
ALP SERPL-CCNC: 58 U/L (ref 40–136)
ALT SERPL-CCNC: 17 U/L (ref 0–55)
BASOPHILS # BLD AUTO: 0.1 10^3/UL (ref 0–0.1)
BASOPHILS NFR BLD AUTO: 1 % (ref 0–10)
BILIRUB SERPL-MCNC: 0.7 MG/DL (ref 0.1–1)
BUN/CREAT SERPL: 8
CALCIUM SERPL-MCNC: 8.9 MG/DL (ref 8.5–10.1)
CHLORIDE SERPL-SCNC: 104 MMOL/L (ref 98–107)
CO2 SERPL-SCNC: 23 MMOL/L (ref 21–32)
CREAT SERPL-MCNC: 0.96 MG/DL (ref 0.6–1.3)
EOSINOPHIL # BLD AUTO: 0.4 10^3/UL (ref 0–0.3)
EOSINOPHIL NFR BLD AUTO: 6 % (ref 0–10)
GFR SERPLBLD BASED ON 1.73 SQ M-ARVRAT: 82 ML/MIN
GLUCOSE SERPL-MCNC: 176 MG/DL (ref 70–105)
HCT VFR BLD CALC: 28 % (ref 40–54)
HGB BLD-MCNC: 7.6 G/DL (ref 13.3–17.7)
LYMPHOCYTES # BLD AUTO: 1.6 10^3/UL (ref 1–4)
LYMPHOCYTES NFR BLD AUTO: 23 % (ref 12–44)
MANUAL DIFFERENTIAL PERFORMED BLD QL: NO
MCH RBC QN AUTO: 17 PG (ref 25–34)
MCHC RBC AUTO-ENTMCNC: 27 G/DL (ref 32–36)
MCV RBC AUTO: 63 FL (ref 80–99)
MONOCYTES # BLD AUTO: 0.6 10^3/UL (ref 0–1)
MONOCYTES NFR BLD AUTO: 9 % (ref 0–12)
NEUTROPHILS # BLD AUTO: 3.9 10^3/UL (ref 1.8–7.8)
NEUTROPHILS NFR BLD AUTO: 59 % (ref 42–75)
PLATELET # BLD: 380 10^3/UL (ref 130–400)
PMV BLD AUTO: 10 FL (ref 9–12.2)
POTASSIUM SERPL-SCNC: 4 MMOL/L (ref 3.6–5)
PROT SERPL-MCNC: 6.6 GM/DL (ref 6.4–8.2)
SODIUM SERPL-SCNC: 139 MMOL/L (ref 135–145)
WBC # BLD AUTO: 6.7 10^3/UL (ref 4.3–11)

## 2022-09-03 RX ADMIN — PANTOPRAZOLE SODIUM SCH MG: 40 INJECTION, POWDER, FOR SOLUTION INTRAVENOUS at 08:39

## 2022-09-03 RX ADMIN — SUCRALFATE SCH GM: 1 TABLET ORAL at 11:30

## 2022-09-03 RX ADMIN — SUCRALFATE SCH GM: 1 TABLET ORAL at 05:15

## 2022-09-03 NOTE — PROGRESS NOTE - CARDIOLOGY
Cardiology SOAP Progress Note


Subjective:


No cp or palp or syncope or shortness of breath


No n/v/d


No focal weakness


States feels great and wants to go home





Objective:


I&O/Vital Signs











 9/3/22 9/3/22 9/3/22 9/3/22





 01:00 02:27 03:40 07:00


 


Temp  35.4 35.5 


 


Pulse 67 73 64 65


 


Resp   18 


 


B/P (MAP)   112/64 (80) 


 


Pulse Ox  94 98 


 


O2 Delivery   Room Air 


 


    





 9/3/22 9/3/22 9/3/22 





 07:28 09:17 11:13 


 


Temp 36.2  36.0 


 


Pulse 75  76 


 


Resp 20  20 


 


B/P (MAP) 154/76 (102)  149/74 (99) 


 


Pulse Ox 95  96 


 


O2 Delivery Room Air Nasal Cannula Room Air 


 


O2 Flow Rate  2.00  














 9/2/22





 23:59


 


Intake Total 1880 ml


 


Output Total 700 ml


 


Balance 1180 ml








Constitutional:  AAO x 3, well-developed, well-nourished, other (obes)


Respiratory:  No accessory muscle use; other (good, bilateral air entry)


Cardiovascular:  regular rate-rhythm, S1 and S2, systolic murmur (soft ERICA at 

card base)


Gastrointestional:  No tender; soft; No guarding, No rebound; audible bowel 

sounds


Extremities:  No clubbing, No cyanosis, No significant edema


Neurologic/Psychiatric:  oriented x 3, other (moves all limbs equally)


Skin:  No rash on exposed areas, No ulcerations on exposed areas





Results/Procedures:


Labs


Laboratory Tests


9/2/22 16:08: Glucometer 218H


9/2/22 20:28: Glucometer 244H


9/3/22 05:28: 


White Blood Count 6.7, Red Blood Count 4.43, Hemoglobin 7.6L, Hematocrit 28L, 

Mean Corpuscular Volume 63L, Mean Corpuscular Hemoglobin 17L, Mean Corpuscular 

Hemoglobin Concent 27L, Red Cell Distribution Width 23.9H, Platelet Count 380, 

Mean Platelet Volume 10.0, Immature Granulocyte % (Auto) 2, Neutrophils (%) 

(Auto) 59, Lymphocytes (%) (Auto) 23, Monocytes (%) (Auto) 9, Eosinophils (%) 

(Auto) 6, Basophils (%) (Auto) 1, Neutrophils # (Auto) 3.9, Lymphocytes # (Auto)

1.6, Monocytes # (Auto) 0.6, Eosinophils # (Auto) 0.4H, Basophils # (Auto) 0.1, 

Immature Granulocyte # (Auto) 0.1, Percent Immature Platelet Fraction 3.2, 

Sodium Level 139, Potassium Level 4.0, Chloride Level 104, Carbon Dioxide Level 

23, Anion Gap 12, Blood Urea Nitrogen 8, Creatinine 0.96, Estimat Glomerular 

Filtration Rate 82, BUN/Creatinine Ratio 8, Glucose Level 176H, Calcium Level 

8.9, Corrected Calcium 9.4, Total Bilirubin 0.7, Aspartate Amino Transf 

(AST/SGOT) 28, Alanine Aminotransferase (ALT/SGPT) 17, Alkaline Phosphatase 58, 

Total Protein 6.6, Albumin 3.4





Microbiology


9/1/22 MRSA Screen - Final, Complete


         MRSA not isolated





Laboratory Tests


9/1/22 15:01








9/2/22 01:05








9/2/22 05:42








9/3/22 05:28











A/P:


Assessment:





Chest/epigastric discomfort of undetermined etiology





Severe anemia due to GI bleed


- received blood transfusions during the hospitalization; hgb still low at 7.6


- Dr Stearns, his cardiologist, has held off on his antiplatelet therapy





Paroxysmal atrial fibrillation, h/o failed cardioversion in May 2021, had 

another cardioversion on 6.2.21 by Dr. Gardner at [facility]


- Maintained on Eliquis, Cardizem CD, Multaq.


- Dr Stearns, his cardiologist, has held off on oral anticoag because of severe GI

bleed requiring transfusion (Hgb still low)





Coronary artery disease, history of multiple interventions in the past, 

reporting that he had multiple stents done at [facility] in the remote past





Abdominal aortic aneurysm, workup showed that the aneurysm measured 5 cm in the 

infrarenal abdominal aorta, he has bilateral iliac aneurysm


- s/p AAA repair done by Dr. Rivera in April 2021.





Hypertension





Hyperlipidemia





Diabetes mellitus II





COPD/KENROY, was intolerant to C Pap machine





Obesity, BMI approx 38


- wgt loss advised





Tobaccoism, stopped smoking 2 years ago


- advised to continue with smoking cessation.


Plan:





* Complex management due to multiple CV comorbidities


* Insists on going home. Dr Grigsby had already written his discharge by the time

  I saw him


* I advised very close cardiac and medical f/u as an outpt


* Advised f/u with Dr Stearns this coming week











CLAUS DO MD FACP FAC CCDS    Sep 3, 2022 12:17

## 2022-09-03 NOTE — PROGRESS NOTE - HOSPITALIST
Subjective


HPI/CC On Admission


Date Seen by Provider:  Sep 3, 2022


Time Seen by Provider:  11:00





Objective


Exam


Vital Signs





Vital Signs








  Date Time  Temp Pulse Resp B/P (MAP) Pulse Ox O2 Delivery O2 Flow Rate FiO2


 


9/3/22 11:13 36.0 76 20 149/74 (99) 96 Room Air  


 


9/3/22 09:17       2.00 


 


8/31/22 20:20        28





Capillary Refill : Less Than 3 Seconds





Results/Procedures


Lab


Laboratory Tests


9/3/22 05:28








Patient resulted labs reviewed.





Assessment/Plan


Assessment and Plan


Assess & Plan/Chief Complaint


GIB monitoring





Clinical Quality Measures


DVT/VTE Prophylaxis Comfirm.Dx


Pharmacological not ordered:  Aneurysm, Active bleeding (Possible active bleed 

of unknown origin), Risk of Bleeding











MENDEL OLIVERA DO                 Sep 3, 2022 08:01

## 2022-09-03 NOTE — OPERATIVE REPORT
DATE OF SERVICE:  09/02/2022



PREOPERATIVE DIAGNOSIS:

Anemia, epigastric abdominal pain.



POSTOPERATIVE DIAGNOSES:

Small hiatal hernia, risks of esophagitis, gastritis.



SURGEON:

Merced Perez DO



ANESTHESIA:

Per CRNA.



ESTIMATED BLOOD LOSS:

None.



COMPLICATIONS:

None.



PROCEDURE:

EGD with biopsies.



INDICATIONS:

The patient is a 77-year-old male found to be anemic and having epigastric

abdominal pain.  He understands risks and benefits of procedure and wished to

proceed.  Consent was signed in the chart.



DESCRIPTION OF PROCEDURE:

The patient was taken to the endoscopy suite, placed in left lateral recumbent

position.  Timeout was performed.  Scope was inserted in mouth, down the

esophagus, stomach and into the duodenum without difficulty.  No polyps, masses

or ulcerations within the duodenum.  Scope was slowly retracted back to stomach

where it was further insufflated.  A slight appearance of gastritis.  No masses

or ulcerations.  Biopsy of the antrum was obtained.  Scope was retroflexed

noting a small hiatal hernia, no other pathology.  Scope was returned to its

normal position, slowly withdrawn to distal esophagus, had changes of erosive

esophagitis.  Biopsy of the GE junction was obtained.  Scope was slowly

retracted back to completely remove, noting no other pathology.  The patient

tolerated procedure well without any complications, taken to recovery room in

stable condition.



RECOMMENDATIONS:

The patient with no active bleeding at the time of this, but he does have

changed.  There was an appearance of erosive esophagitis that was likely the

cause of bleeding.  We will add Carafate 1 gram four times a day.  Continue on

Protonix.  The patient will follow up outpatient.  Continue to monitor the blood

level transfusing as needed.





Job ID: [id number]

DocumentID: [id number]

Dictated Date:  09/02/2022 21:46:53

Transcription Date: 09/03/2022 01:12:40

Dictated By: MERCED PEREZ DO

## 2022-09-03 NOTE — DISCHARGE SUMMARY
Discharge Summary


Hospital Course


Was the Problem List Reviewed?:  Yes


Hospital Course


Date of Admission: Sep 1, 2022 at 13:04 


Admission Diagnosis :  





Family Physician/Provider: Luis Mcpherson MD  





Date of Discharge: 9/3/22 


Discharge Diagnosis: [ ]








Hospital Course:


standard course after admitted for severe anemia and Plavix and ASA and OAC 

hold. No EGD done until after DC. 2 units of blood received. Patient felt 

improved at DC.














Labs and Pending Lab Test:


Laboratory Tests


22 16:08: Glucometer 218H


22 20:28: Glucometer 244H


9/3/22 05:28: 


White Blood Count 6.7, Red Blood Count 4.43, Hemoglobin 7.6L, Hematocrit 28L, 

Mean Corpuscular Volume 63L, Mean Corpuscular Hemoglobin 17L, Mean Corpuscular 

Hemoglobin Concent 27L, Red Cell Distribution Width 23.9H, Platelet Count 380, 

Mean Platelet Volume 10.0, Immature Granulocyte % (Auto) 2, Neutrophils (%) 

(Auto) 59, Lymphocytes (%) (Auto) 23, Monocytes (%) (Auto) 9, Eosinophils (%) 

(Auto) 6, Basophils (%) (Auto) 1, Neutrophils # (Auto) 3.9, Lymphocytes # (Auto)

1.6, Monocytes # (Auto) 0.6, Eosinophils # (Auto) 0.4H, Basophils # (Auto) 0.1, 

Immature Granulocyte # (Auto) 0.1, Percent Immature Platelet Fraction 3.2, 

Sodium Level 139, Potassium Level 4.0, Chloride Level 104, Carbon Dioxide Level 

23, Anion Gap 12, Blood Urea Nitrogen 8, Creatinine 0.96, Estimat Glomerular 

Filtration Rate 82, BUN/Creatinine Ratio 8, Glucose Level 176H, Calcium Level 

8.9, Corrected Calcium 9.4, Total Bilirubin 0.7, Aspartate Amino Transf 

(AST/SGOT) 28, Alanine Aminotransferase (ALT/SGPT) 17, Alkaline Phosphatase 58, 

Total Protein 6.6, Albumin 3.4





Microbiology


22 MRSA Screen - Final, Complete


         MRSA not isolated





Home Meds


Active


Protonix (Pantoprazole Sodium) 40 Mg Tablet.dr 40 Mg PO BID


Sucralfate 1 Gram Tablet 1 Gm PO ACHS


Reported


Aspirin EC (Aspirin) 81 Mg Tablet.dr 81 Mg PO DAILY


Furosemide 40 Mg Tablet 40 Mg PO DAILY


Advair 250-50 Diskus (Fluticasone/Salmeterol) 250 Mcg-50 Mcg/Dose Blst.w.dev 1 

Each IH BID


Diltiazem ER (Diltiazem HCl) 120 Mg Capsule.er 120 Mg PO DAILY


Spironolactone 25 Mg Tablet 25 Mg PO DAILY


Multaq (Dronedarone HCl) 400 Mg Tablet 400 Mg PO BID


Fluticasone Propionate 50 Mcg/Actuation Spray.susp 2 Sprays NSEACH DAILY PRN


Basaglar Kwikpen U-100 (Insulin Glargine,Hum.rec.anlog) 100 Unit/Ml (3 Ml) 

Insuln.pen 65 Unit SQ BID


Novolog Flexpen (Insulin Aspart) 100 Unit/Ml (3 Ml) Solution 28 Units SQ AC


Proair Hfa (Albuterol Sulfate) 1 Puff Puff 2 Puff IH Q4H PRN


Eliquis (Apixaban) 5 Mg Tablet 5 Mg PO BID


Metoprolol Succinate 50 Mg Tab.er.24h 50 Mg PO DAILY


Rosuvastatin Calcium 40 Mg Tablet 40 Mg PO DAILY


Clopidogrel (Clopidogrel Bisulfate) 75 Mg Tablet 75 Mg PO DAILY


Montelukast Sodium 10 Mg Tablet 10 Mg PO HS


Fenofibrate (Fenofibrate Nanocrystallized) 145 Mg Tablet 145 Mg PO DAILY


Pregabalin 100 Mg Capsule 100 Mg PO 0000,0600,1800


Omeprazole 20 Mg Capsule.dr 20 Mg PO DAILY


Assessment/Pt Instructions


PCP 1 week


Discharge Planning:  <30 minutes discharge planning





Discharge Instructions


Discharge Diet:  No Restrictions


Activity as Tolerated:  Yes





Discharge Physical Examination


Vital Signs





Vital Signs








  Date Time  Temp Pulse Resp B/P (MAP) Pulse Ox O2 Delivery O2 Flow Rate FiO2


 


9/3/22 11:13 36.0 76 20 149/74 (99) 96 Room Air  


 


9/3/22 09:17       2.00 


 


22 20:20        28








General Appearance:  No Apparent Distress, WD/WN, Chronically ill


Allergies:  


Coded Allergies:  


     lisinopril (Verified  Allergy, Mild, 21)


   MUSCLE ACHES


     losartan (Verified  Allergy, Mild, Rash, 21)


     lovastatin (Verified  Allergy, Mild, 21)


   MUSCLE ACHES


     rosiglitazone (Verified  Allergy, Mild, Hives, 21)


     umeclidinium (Verified  Allergy, Mild, Rash, 21)





Discharge Summary


Date of Admission


Sep 1, 2022 at 13:04


Date of Discharge





Discharge Date:  Sep 3, 2022


Comfort Measures/


Advance Care discuss with:  patient (Patient wishes to be changed from DNR 

status to full code)





Discharge Diagnosis


GIB monitoring





Clinical Quality Measures


DVT/VTE Prophylaxis Comfirm.Dx


Pharmacological not ordered:  Aneurysm, Active bleeding (Possible active bleed 

of unknown origin), Risk of Bleeding











MENDEL OLIVERA DO                 Sep 3, 2022 11:39

## 2022-09-03 NOTE — PROGRESS NOTE - SURGERY
BASIM CONNER 9/3/22 1003:


Subjective


Date Seen by a Provider:  Sep 3, 2022


Time Seen by a Provider:  07:40


Subjective/Events-last exam


Patient is doing well today. He still has lower abdominal pain at 3/10 but says 

it is much better than the day before. He is having constant nausea but has not 

vomited. Patient claimed his last bowel movement had blood in it. He is 

ambulating to use the restroom, he does not have an IS in the room. Tolerating 

liquid diet well.


Review of Systems


General:  No Chills, No Night Sweats


Pulmonary:  No Dyspnea, No Cough


Cardiovascular:  No: Chest Pain


Gastrointestinal:  Nausea, Abdominal Pain; No: Vomiting


Musculoskeletal:  neck pain





Objective


Exam





Vital Signs








  Date Time  Temp Pulse Resp B/P (MAP) Pulse Ox O2 Delivery O2 Flow Rate FiO2


 


9/3/22 09:17      Nasal Cannula 2.00 


 


9/3/22 07:28 36.2 75 20 154/76 (102) 95 Room Air  


 


9/3/22 07:00  65      


 


9/3/22 03:40 35.5 64 18 112/64 (80) 98 Room Air  


 


9/3/22 02:27 35.4 73   94   


 


9/3/22 01:00  67      


 


9/2/22 23:27  89      


 


9/2/22 23:17 35.4 73 18 144/63 (90) 94 Room Air  


 


9/2/22 23:00     96 Nasal Cannula 2.00 


 


9/2/22 19:04 36.4 79 20 144/74 (97) 98 OxyMask 4.00 


 


9/2/22 19:00  80      


 


9/2/22 16:00 36.5 88 16 146/77 (100) 95 Room Air  


 


9/2/22 15:20  71 16  100 OxyMask 4.00 


 


9/2/22 15:17  72 16  100 OxyMask 4.00 


 


9/2/22 12:41  72      


 


9/2/22 12:15 35.7 73 18 160/82 (108) 98 Room Air  














I & O 


 


 9/3/22





 07:00


 


Intake Total 2520 ml


 


Output Total 700 ml


 


Balance 1820 ml





Capillary Refill : Less Than 3 Seconds


General Appearance:  No Apparent Distress


Neck:  Tender Midline (Patient said tenderness is from EGD yesterday)


Respiratory:  Lungs Clear, Normal Breath Sounds, No Accessory Muscle Use, No 

Respiratory Distress


Cardiovascular:  Regular Rate, Rhythm, No Murmur, Normal Peripheral Pulses


Peripheral Pulses:  2+ Radial Pulses (R), 2+ Radial Pulses (L)


Gastrointestinal:  normal bowel sounds, soft


Neurologic/Psychiatric:  Alert, Oriented x3





Results


Lab


Laboratory Tests


9/2/22 11:17: Glucometer 186H


9/2/22 16:08: Glucometer 218H


9/2/22 20:28: Glucometer 244H


9/3/22 05:28: 


White Blood Count 6.7, Red Blood Count 4.43, Hemoglobin 7.6L, Hematocrit 28L, 

Mean Corpuscular Volume 63L, Mean Corpuscular Hemoglobin 17L, Mean Corpuscular 

Hemoglobin Concent 27L, Red Cell Distribution Width 23.9H, Platelet Count 380, 

Mean Platelet Volume 10.0, Immature Granulocyte % (Auto) 2, Neutrophils (%) 

(Auto) 59, Lymphocytes (%) (Auto) 23, Monocytes (%) (Auto) 9, Eosinophils (%) 

(Auto) 6, Basophils (%) (Auto) 1, Neutrophils # (Auto) 3.9, Lymphocytes # (Auto)

1.6, Monocytes # (Auto) 0.6, Eosinophils # (Auto) 0.4H, Basophils # (Auto) 0.1, 

Immature Granulocyte # (Auto) 0.1, Percent Immature Platelet Fraction 3.2, 

Sodium Level 139, Potassium Level 4.0, Chloride Level 104, Carbon Dioxide Level 

23, Anion Gap 12, Blood Urea Nitrogen 8, Creatinine 0.96, Estimat Glomerular 

Filtration Rate 82, BUN/Creatinine Ratio 8, Glucose Level 176H, Calcium Level 

8.9, Corrected Calcium 9.4, Total Bilirubin 0.7, Aspartate Amino Transf 

(AST/SGOT) 28, Alanine Aminotransferase (ALT/SGPT) 17, Alkaline Phosphatase 58, 

Total Protein 6.6, Albumin 3.4





Microbiology


9/1/22 MRSA Screen - Final, Complete


         MRSA not isolated





Assessment/Plan


anemia


abdominal pain lower abdomen and epigastric area





Patient hemoglobin is stable at 7.6 from 7.9 yesterday. Advance diet today. 

Patient will follow up outpatient with Dr. Perez for colonoscopy.





Clinical Quality Measures


DVT/VTE Prophylaxis Comfirm.Dx


Pharmacological not ordered:  Aneurysm, Active bleeding (Possible active bleed 

of unknown origin), Risk of Bleeding





WISAM PAYTON DO 9/3/22 1131:


Subjective


Time Seen by a Provider:  10:29


Subjective/Events-last exam


Pt seen and examined, states he feels fine today and is wondering if he can go 

home.


Review of Systems


General:  No Chills, No Night Sweats


Pulmonary:  No Dyspnea, No Cough


Gastrointestinal:  Nausea, Abdominal Pain; No: Vomiting





Objective


Exam


General Appearance:  No Apparent Distress


Respiratory:  Lungs Clear, Normal Breath Sounds, No Accessory Muscle Use, No 

Respiratory Distress


Cardiovascular:  Regular Rate, Rhythm, No Murmur


Gastrointestinal:  normal bowel sounds, non tender, soft, distended





Assessment/Plan


Erosive Gastritis


Anemia


Abdominal pain lower abdomen and epigastric area





Patient hemoglobin is stable at 7.6 from 7.9 yesterday. Advance diet today. 

Patient will follow up outpatient with Dr. Perez for colonoscopy.





Supervisory-Addendum Brief


Verification & Attestation


Participated in pt care:  history, MDM, physical


Personally performed:  exam, history, MDM, supervision of care


Care discussed with:  Medical Student


Procedures:  n/a


Verification and Attestation of Medical Student E/M Service





A medical student performed and documented this service. I then reviewed and 

verified all information documented by the medical student and made 

modifications to such information, when appropriate. I personally performed a 

physical exam, medical decision making and then discussed any differences 

between the notes and made revisions as necessary to create one note.





Wisam Payton , 9/3/22 , 11:35











BASIM CONNER                 Sep 3, 2022 10:03


WISAM PAYTON DO                Sep 3, 2022 11:31

## 2022-09-15 ENCOUNTER — HOSPITAL ENCOUNTER (OUTPATIENT)
Dept: HOSPITAL 75 - CARDFS | Age: 77
End: 2022-09-15
Attending: INTERNAL MEDICINE
Payer: MEDICARE

## 2022-09-15 DIAGNOSIS — I10: ICD-10-CM

## 2022-09-15 DIAGNOSIS — I35.1: Primary | ICD-10-CM

## 2022-09-15 PROCEDURE — 93306 TTE W/DOPPLER COMPLETE: CPT

## 2022-10-12 ENCOUNTER — HOSPITAL ENCOUNTER (OUTPATIENT)
Dept: HOSPITAL 75 - PREOP | Age: 77
Discharge: HOME | End: 2022-10-12
Attending: SURGERY
Payer: MEDICARE

## 2022-10-12 VITALS — BODY MASS INDEX: 38.25 KG/M2 | WEIGHT: 298.06 LBS | HEIGHT: 74.02 IN

## 2022-10-12 DIAGNOSIS — Z01.818: Primary | ICD-10-CM

## 2022-10-21 ENCOUNTER — HOSPITAL ENCOUNTER (OUTPATIENT)
Dept: HOSPITAL 75 - ENDO | Age: 77
Discharge: HOME | End: 2022-10-21
Attending: SURGERY
Payer: MEDICARE

## 2022-10-21 VITALS — DIASTOLIC BLOOD PRESSURE: 72 MMHG | SYSTOLIC BLOOD PRESSURE: 134 MMHG

## 2022-10-21 VITALS — WEIGHT: 298.06 LBS | HEIGHT: 74.02 IN | BODY MASS INDEX: 38.25 KG/M2

## 2022-10-21 VITALS — DIASTOLIC BLOOD PRESSURE: 65 MMHG | SYSTOLIC BLOOD PRESSURE: 129 MMHG

## 2022-10-21 VITALS — SYSTOLIC BLOOD PRESSURE: 130 MMHG | DIASTOLIC BLOOD PRESSURE: 67 MMHG

## 2022-10-21 VITALS — DIASTOLIC BLOOD PRESSURE: 60 MMHG | SYSTOLIC BLOOD PRESSURE: 132 MMHG

## 2022-10-21 DIAGNOSIS — Z79.01: ICD-10-CM

## 2022-10-21 DIAGNOSIS — E66.9: ICD-10-CM

## 2022-10-21 DIAGNOSIS — D12.2: Primary | ICD-10-CM

## 2022-10-21 DIAGNOSIS — D12.3: ICD-10-CM

## 2022-10-21 DIAGNOSIS — Z87.891: ICD-10-CM

## 2022-10-21 NOTE — OPERATIVE REPORT
DATE OF SERVICE:  10/21/2022



PREOPERATIVE DIAGNOSIS:

Melena.



POSTOPERATIVE DIAGNOSIS:

Colon polyps.



PROCEDURE:

Colonoscopy with hot biopsy polypectomy x3.



SURGEON:

Merced Perez DO



ANESTHESIA:

Per CRNA.



ESTIMATED BLOOD LOSS:

None.



COMPLICATIONS:

None.



INDICATIONS:

The patient is a 77-year-old male with melena.  He understands risks and

benefits of procedure and wishes to proceed.  Consent was signed in the chart.



DESCRIPTION OF PROCEDURE:

The patient was taken to the endoscopy suite, placed in left lateral recumbent

position.  Timeout was performed.  Digital rectal exam was performed.  No

palpable polyps, masses or ulcerations.  Scope was inserted in the rectum and

advanced all the way to cecum with minimal difficulty.  Prep was adequate. 

Scope was then slowly retracted back.  No polyps, masses or ulcerations in the

cecum.  In the ascending, there was a small polyp, which hot biopsy polypectomy

was performed.  Scope was continuously retracted back.  No polyps, masses or

ulcerations in remainder of the ascending colon; in transverse colon, 2 polyps

were present, which hot biopsy polypectomy was performed.  Scope was

continuously retracted back.  No polyps, masses or ulcerations within the

descending and sigmoid colon.  Once in the rectum, scope was retroflexed noting

no other pathology.  Scope was returned to its normal position, slowly withdrawn

until completely removed.  The patient tolerated procedure well without any

complications, taken to recovery room in stable condition.



RECOMMENDATIONS:

The patient will need followup on pathology.  Due to age, he does not need any

further colonoscopies unless he is symptomatic and needs reevaluation.  The

patient will follow up in our office in 2 weeks.





Job ID: [id number]

DocumentID: [id number]

Dictated Date:  10/21/2022 12:54:00

Transcription Date: 10/21/2022 20:19:01

Dictated By: MERCED PEREZ DO

## 2022-10-21 NOTE — PROGRESS NOTE-PRE OPERATIVE
Pre-Operative Progress Note


Date of Available H&P:  Oct 21, 2022


Date H&P Reviewed:  Oct 21, 2022


Time H&P Reviewed:  12:00


History & Physical:  H&P Reviewed, Patient Examed


Pre-Operative Diagnosis:  MelMERCED Hayes DO              Oct 21, 2022 12:53

## 2022-10-21 NOTE — PROGRESS NOTE-POST OPERATIVE
Post-Operative Progess Note


Surgeon (s)/Assistant (s)


Surgeon


MERCED RYAN DO


Assistant:  NA





Pre-Operative Diagnosis


Melena





Post-Operative Diagnosis





Colon polyps





Procedure & Operative Findings


Date of Procedure


10/21/22


Procedure Performed/Findings


Colonoscopy with hot biopsy polypectomy x3


Anesthesia Type


per cRNA





Estimated Blood Loss


Estimated blood loss (mL):  None





Specimens/Packing


Specimens Removed


Ascending colon polyp x1


Transverse colon polyp x2











MERCED RYAN DO              Oct 21, 2022 12:54

## 2022-10-21 NOTE — ANESTHESIA-GENERAL POST-OP
MAC


Patient Condition


Mental Status/LOC:  Same as Preop


Cardiovascular:  Satisfactory


Nausea/Vomiting:  Absent


Respiratory:  Satisfactory


Pain:  Controlled


Complications:  Absent





Post Op Complications


Complications


None





Follow Up Care/Instructions


Patient Instructions


None needed.





Anesthesiology Discharge Order


Discharge Order


Patient is doing well, no complaints, stable vital signs, no apparent adverse 

anesthesia problems.   


No complications reported per nursing.











MANUEL ANGULO CRNA         Oct 21, 2022 14:43

## 2022-10-21 NOTE — DISCHARGE INST-SIMPLE/STANDARD
Discharge Inst-Standard


Reconcile Patient Problems


Problems Reviewed?:  Yes





Patient Instructions/Follow Up


Plan of Care/Instructions/FU:  


F/u with Dr. Perez in 2 weeks


Activity as Tolerated:  Yes


Discharge Diet:  Regular Diet











MERCED PEREZ DO              Oct 21, 2022 12:55

## 2023-03-18 ENCOUNTER — HOSPITAL ENCOUNTER (EMERGENCY)
Dept: HOSPITAL 75 - ER FS | Age: 78
Discharge: HOME | End: 2023-03-18
Payer: MEDICARE

## 2023-03-18 VITALS — SYSTOLIC BLOOD PRESSURE: 129 MMHG | DIASTOLIC BLOOD PRESSURE: 60 MMHG

## 2023-03-18 VITALS — HEIGHT: 73.98 IN | BODY MASS INDEX: 38.48 KG/M2 | WEIGHT: 299.83 LBS

## 2023-03-18 DIAGNOSIS — Z87.891: ICD-10-CM

## 2023-03-18 DIAGNOSIS — Z28.310: ICD-10-CM

## 2023-03-18 DIAGNOSIS — Z79.02: ICD-10-CM

## 2023-03-18 DIAGNOSIS — J96.11: ICD-10-CM

## 2023-03-18 DIAGNOSIS — I48.0: ICD-10-CM

## 2023-03-18 DIAGNOSIS — Z99.81: ICD-10-CM

## 2023-03-18 DIAGNOSIS — Z95.5: ICD-10-CM

## 2023-03-18 DIAGNOSIS — J44.1: Primary | ICD-10-CM

## 2023-03-18 LAB
ALBUMIN SERPL-MCNC: 3.6 GM/DL (ref 3.2–4.5)
ALP SERPL-CCNC: 64 U/L (ref 40–136)
ALT SERPL-CCNC: 23 U/L (ref 0–55)
APTT BLD: 35 SEC (ref 24–35)
BASOPHILS # BLD AUTO: 0 10^3/UL (ref 0–0.1)
BASOPHILS NFR BLD AUTO: 0 % (ref 0–10)
BILIRUB SERPL-MCNC: 0.5 MG/DL (ref 0.1–1)
BUN/CREAT SERPL: 14
CALCIUM SERPL-MCNC: 8.6 MG/DL (ref 8.5–10.1)
CHLORIDE SERPL-SCNC: 100 MMOL/L (ref 98–107)
CO2 SERPL-SCNC: 23 MMOL/L (ref 21–32)
CREAT SERPL-MCNC: 1 MG/DL (ref 0.6–1.3)
EOSINOPHIL # BLD AUTO: 0.1 10^3/UL (ref 0–0.3)
EOSINOPHIL NFR BLD AUTO: 1 % (ref 0–10)
GFR SERPLBLD BASED ON 1.73 SQ M-ARVRAT: 78 ML/MIN
GLUCOSE SERPL-MCNC: 210 MG/DL (ref 70–105)
HCT VFR BLD CALC: 39 % (ref 40–54)
HGB BLD-MCNC: 12.1 G/DL (ref 13.3–17.7)
INR PPP: 1 (ref 0.8–1.4)
LIPASE SERPL-CCNC: 20 U/L (ref 8–78)
LYMPHOCYTES # BLD AUTO: 1.3 10^3/UL (ref 1–4)
LYMPHOCYTES NFR BLD AUTO: 10 % (ref 12–44)
MAGNESIUM SERPL-MCNC: 1.9 MG/DL (ref 1.6–2.4)
MANUAL DIFFERENTIAL PERFORMED BLD QL: NO
MCH RBC QN AUTO: 24 PG (ref 25–34)
MCHC RBC AUTO-ENTMCNC: 31 G/DL (ref 32–36)
MCV RBC AUTO: 76 FL (ref 80–99)
MONOCYTES # BLD AUTO: 1 10^3/UL (ref 0–1)
MONOCYTES NFR BLD AUTO: 7 % (ref 0–12)
NEUTROPHILS # BLD AUTO: 10.8 10^3/UL (ref 1.8–7.8)
NEUTROPHILS NFR BLD AUTO: 82 % (ref 42–75)
PLATELET # BLD: 215 10^3/UL (ref 130–400)
PMV BLD AUTO: 10.3 FL (ref 9–12.2)
POTASSIUM SERPL-SCNC: 3.9 MMOL/L (ref 3.6–5)
PROT SERPL-MCNC: 7.2 GM/DL (ref 6.4–8.2)
PROTHROMBIN TIME: 14.1 SEC (ref 12.2–14.7)
SODIUM SERPL-SCNC: 134 MMOL/L (ref 135–145)
WBC # BLD AUTO: 13.2 10^3/UL (ref 4.3–11)

## 2023-03-18 PROCEDURE — 94640 AIRWAY INHALATION TREATMENT: CPT

## 2023-03-18 PROCEDURE — 85610 PROTHROMBIN TIME: CPT

## 2023-03-18 PROCEDURE — 36415 COLL VENOUS BLD VENIPUNCTURE: CPT

## 2023-03-18 PROCEDURE — 85730 THROMBOPLASTIN TIME PARTIAL: CPT

## 2023-03-18 PROCEDURE — 80053 COMPREHEN METABOLIC PANEL: CPT

## 2023-03-18 PROCEDURE — 83735 ASSAY OF MAGNESIUM: CPT

## 2023-03-18 PROCEDURE — 85025 COMPLETE CBC W/AUTO DIFF WBC: CPT

## 2023-03-18 PROCEDURE — 93005 ELECTROCARDIOGRAM TRACING: CPT

## 2023-03-18 PROCEDURE — 83880 ASSAY OF NATRIURETIC PEPTIDE: CPT

## 2023-03-18 PROCEDURE — 83690 ASSAY OF LIPASE: CPT

## 2023-03-18 PROCEDURE — 84484 ASSAY OF TROPONIN QUANT: CPT

## 2023-03-18 PROCEDURE — 71045 X-RAY EXAM CHEST 1 VIEW: CPT

## 2023-03-18 PROCEDURE — 93041 RHYTHM ECG TRACING: CPT

## 2023-03-18 NOTE — ED RESPIRATORY
General


Stated Complaint:  SOB/WEAKNESS


Source:  patient, old records


Exam Limitations:  no limitations





History of Present Illness


Date Seen by Provider:  Mar 18, 2023


Time Seen by Provider:  03:02


Initial Comments


77yoM with PMH of chronic hypoxic respiratory failure on 2 L oxygen, CAD s/p 

stenting, pAFIB on Eliquis, HTN, HLD, DM, COPD, and AAA s/p repair coming in due

to 3 days of increasing productive cough as well as increasing dyspnea.  Went to

the urgent care earlier today and was told it likely was something viral and was

discharged.  He states he has some chest tightness associated with it.  Slightly

better with breathing treatments that he is taking at home.  He has brought his 

oxygen up to 3 L which has helped somewhat.  He is otherwise denying any other 

acute complaints.





Allergies and Home Medications


Allergies


Coded Allergies:  


     lisinopril (Verified  Allergy, Mild, 5/12/21)


   MUSCLE ACHES


     losartan (Verified  Allergy, Mild, Rash, 5/12/21)


     lovastatin (Verified  Allergy, Mild, 5/12/21)


   MUSCLE ACHES


     rosiglitazone (Verified  Allergy, Mild, Hives, 5/12/21)


     umeclidinium (Verified  Allergy, Mild, Rash, 5/12/21)





Patient Home Medication List


Home Medication List Reviewed:  Yes


Albuterol Sulfate (Ventolin Hfa) 1 Puff Puff, 2 PUFF IH Q4H PRN for SHORTNESS OF

BREATH, (Reported)


   Entered as Reported by: TEDDY ROMAN on 5/12/21 1043


Calcium Polycarbophil (Fibercon) 625 Mg Tablet, 625 MG PO DAILY, (Reported)


   Entered as Reported by: JULIE A IBEH on 10/12/22 0903


Diltiazem HCl (Diltiazem ER) 120 Mg Capsule.er, 120 MG PO DAILY, (Reported)


   Entered as Reported by: JULIETA DUEÑAS on 9/1/22 1048


Dronedarone HCl (Multaq) 400 Mg Tablet, 400 MG PO BID, (Reported)


   Entered as Reported by: JULIETA DUEÑAS on 10/1/21 1124


Famotidine (Acid Reducer (FAMOTIDINE)) 20 Mg Tablet, 20 MG PO BID, (Reported)


   Entered as Reported by: JULIE A IBEH on 10/12/22 0903


Fenofibrate Nanocrystallized (Fenofibrate) 145 Mg Tablet, 145 MG PO DAILY, 

(Reported)


   Entered as Reported by: TEDDY ROMAN on 5/12/21 1043


Fluticasone Propionate (Fluticasone Propionate) 50 Mcg/Actuation Pass Christian.susp, 2 

SPRAYS NSEACH DAILY PRN for CONGESTION, (Reported)


   Entered as Reported by: JULIETA DUEÑAS on 10/1/21 1124


Fluticasone/Salmeterol (Advair 250-50 Diskus) 250 Mcg-50 Mcg/Dose Blst.w.dev, 1 

EACH IH BID, (Reported)


   Entered as Reported by: JULIETA DUEÑAS on 9/1/22 1048


Insulin Aspart (Novolog Flexpen) 100 Unit/Ml (3 Ml) Solution, 28 UNITS SQ AC, 

(Reported)


   Entered as Reported by: TEDDY ROMAN on 5/12/21 1043


Insulin Glargine,Hum.rec.anlog (Basaglar Kwikpen U-100) 100 Unit/Ml (3 Ml) 

Insuln.pen, 65 UNIT SQ BID, (Reported)


   Entered as Reported by: TEDDY ROMAN on 5/12/21 1043


Metoprolol Succinate (Metoprolol Succinate) 50 Mg Tab.er.24h, 50 MG PO DAILY, 

(Reported)


   Entered as Reported by: TEDDY ROMAN on 5/12/21 1043


Montelukast Sodium (Montelukast Sodium) 10 Mg Tablet, 10 MG PO HS, (Reported)


   Entered as Reported by: TEDDY ROMAN on 5/12/21 1043


Pantoprazole Sodium (Protonix) 40 Mg Tablet.dr, 40 MG PO BID


   Prescribed by: MENDEL OLIVERA on 9/3/22 1137


Pregabalin (Pregabalin) 100 Mg Capsule, 100 MG PO 0000,0600,1800, (Reported)


   Entered as Reported by: TEDDY ROMAN on 5/12/21 1043


Rosuvastatin Calcium (Rosuvastatin Calcium) 40 Mg Tablet, 40 MG PO DAILY, 

(Reported)


   Entered as Reported by: TEDDY ROMAN on 5/12/21 1043


Sucralfate (Sucralfate) 1 Gram Tablet, 1 GM PO ACHS


   Prescribed by: MENDEL OLIVERA on 9/3/22 1137


Tirzepatide (Mounjaro) 2.5 Mg/0.5 Ml Pen.injctr, 2.5 MG SQ WEEK, (Reported)


   Entered as Reported by: ASHVIN MICHELLE IBEH on 10/12/22 0903





Review of Systems


Review of Systems


Constitutional:  No fever


EENTM:  no symptoms reported


Respiratory:  see HPI


Cardiovascular:  see HPI


Gastrointestinal:  no symptoms reported


Musculoskeletal:  no symptoms reported


Skin:  no symptoms reported


Psychiatric/Neurological:  No Symptoms Reported


Hematologic/Lymphatic:  No Symptoms Reported





Past Medical-Social-Family Hx


Patient Social History


Tobacco Use?:  No


Tobacco type used:  Cigarettes


Smoking Status:  Former Smoker





Immunizations Up To Date


First/Initial COVID19 Vaccinat:  APRIL 2021


Second COVID19 Vaccination Alberto:  MAY 2021


Third COVID19 Vaccination Date:  APRIL 2021





Seasonal Allergies


Seasonal Allergies:  No





Past Medical History


Surgery/Hospitalization HX:  


aneurysm repair; multiple (aortic/iliac), Appendectomy, pilonidal cyst, 


tailbone, HTN, chronic A Fib, hyperlipidemia, IDDM/Type II, COPD, KENROY (no CPAP 


tolerance), skin cancer


Surgeries:  Yes


Appendectomy, Cardiac


Respiratory:  Yes


Emphysema


Cardiac:  Yes


Atrial Fibrillation, Coronary Artery Disease, High Cholesterol, Hypertension


Neurological:  Yes


TIA


Genitourinary:  No


Gastrointestinal:  Yes


Gastroesophageal Reflux


Musculoskeletal:  No


Endocrine:  Yes


HEENT:  No


Cancer:  Yes


Skin


Did You Recieve Any Treatments:  Yes


What Type of Treatment Did You:  Surgical Intervention


Psychosocial:  No


Integumentary:  No


Blood Disorders:  No





Family Medical History


No Pertinent Family Hx





Physical Exam





Vital Signs - First Documented








 3/18/23





 03:00


 


Pulse 83


 


Resp 22


 


B/P (MAP) 125/54 (77)


 


Pulse Ox 95


 


O2 Delivery Nasal Cannula


 


O2 Flow Rate 3.00





Capillary Refill :


Height: '"


Weight: lbs. oz. kg; 38.25 BMI


Method:


General Appearance:  WD/WN, other (Chronically ill-appearing)


Eyes:  Bilateral Eye Normal Inspection


HEENT:  PERRL/EOMI, normal ENT inspection, pharynx normal


Neck:  non-tender, full range of motion, supple, normal inspection


Respiratory:  chest non-tender, no respiratory distress, no accessory muscle 

use, wheezing


Cardiovascular:  regular rate, rhythm, no edema, no murmur


Gastrointestinal:  normal bowel sounds, non tender, soft; No distended, No 

guarding


Extremities:  normal range of motion, non-tender, normal inspection, no pedal 

edema, no calf tenderness, normal capillary refill


Neurologic/Psychiatric:  no motor/sensory deficits, alert, normal mood/affect


Skin:  normal color, warm/dry





Progress/Results/Core Measures


Suspected Sepsis


SIRS


Temperature: 


Pulse:  


Respiratory Rate: 


 


Laboratory Tests


3/18/23 03:15: White Blood Count 13.2H


Blood Pressure  / 


Mean: 


 





Laboratory Tests


3/18/23 03:15: 


Creatinine 1.00, INR Comment 1.0, Platelet Count 215, Total Bilirubin 0.5








Results/Orders


Lab Results





Laboratory Tests








Test


 3/18/23


03:15 Range/Units


 


 


White Blood Count


 13.2 H


 4.3-11.0


10^3/uL


 


Red Blood Count


 5.14 


 4.30-5.52


10^6/uL


 


Hemoglobin 12.1 L 13.3-17.7  g/dL


 


Hematocrit 39 L 40-54  %


 


Mean Corpuscular Volume 76 L 80-99  fL


 


Mean Corpuscular Hemoglobin 24 L 25-34  pg


 


Mean Corpuscular Hemoglobin


Concent 31 L


 32-36  g/dL





 


Red Cell Distribution Width 27.4 H 10.0-14.5  %


 


Platelet Count


 215 


 130-400


10^3/uL


 


Mean Platelet Volume 10.3  9.0-12.2  fL


 


Immature Granulocyte % (Auto) 0   %


 


Neutrophils (%) (Auto) 82 H 42-75  %


 


Lymphocytes (%) (Auto) 10 L 12-44  %


 


Monocytes (%) (Auto) 7  0-12  %


 


Eosinophils (%) (Auto) 1  0-10  %


 


Basophils (%) (Auto) 0  0-10  %


 


Neutrophils # (Auto)


 10.8 H


 1.8-7.8


10^3/uL


 


Lymphocytes # (Auto)


 1.3 


 1.0-4.0


10^3/uL


 


Monocytes # (Auto)


 1.0 


 0.0-1.0


10^3/uL


 


Eosinophils # (Auto)


 0.1 


 0.0-0.3


10^3/uL


 


Basophils # (Auto)


 0.0 


 0.0-0.1


10^3/uL


 


Immature Granulocyte # (Auto)


 0.1 


 0.0-0.1


10^3/uL


 


Prothrombin Time 14.1  12.2-14.7  SEC


 


INR Comment 1.0  0.8-1.4  


 


Activated Partial


Thromboplast Time 35 


 24-35  SEC





 


Sodium Level 134 L 135-145  MMOL/L


 


Potassium Level 3.9  3.6-5.0  MMOL/L


 


Chloride Level 100    MMOL/L


 


Carbon Dioxide Level 23  21-32  MMOL/L


 


Anion Gap 11  5-14  MMOL/L


 


Blood Urea Nitrogen 14  7-18  MG/DL


 


Creatinine


 1.00 


 0.60-1.30


MG/DL


 


Estimat Glomerular Filtration


Rate 78 


  





 


BUN/Creatinine Ratio 14   


 


Glucose Level 210 H   MG/DL


 


Calcium Level 8.6  8.5-10.1  MG/DL


 


Corrected Calcium 8.9  8.5-10.1  MG/DL


 


Magnesium Level 1.9  1.6-2.4  MG/DL


 


Total Bilirubin 0.5  0.1-1.0  MG/DL


 


Aspartate Amino Transf


(AST/SGOT) 28 


 5-34  U/L





 


Alanine Aminotransferase


(ALT/SGPT) 23 


 0-55  U/L





 


Alkaline Phosphatase 64    U/L


 


Troponin I < 0.30  <0.30  NG/ML


 


Total Protein 7.2  6.4-8.2  GM/DL


 


Albumin 3.6  3.2-4.5  GM/DL


 


Lipase 20  8-78  U/L








My Orders





Orders - LUCRECIA GALICIA MD


Cbc With Automated Diff (3/18/23 03:13)


Magnesium (3/18/23 03:13)


Chest 1 View Ap/Pa Only (3/18/23 03:13)


Ekg Tracing (3/18/23 03:13)


Comprehensive Metabolic Panel (3/18/23 03:13)


Protime With Inr (3/18/23 03:13)


Partial Thromboplastin Time (3/18/23 03:13)


O2 (3/18/23 03:13)


Monitor-Rhythm Ecg Trace Only (3/18/23 03:13)


Ed Iv/Invasive Line Start (3/18/23 03:13)


Lipase (3/18/23 03:13)


Troponin I Fs (3/18/23 03:13)


Probnp Fs (3/18/23 03:13)


Prednisone Tablet (Deltasone Tablet) (3/18/23 03:15)


Albuterol/Ipra Inhalation Soln (Duoneb I (3/18/23 03:15)


Ceftriaxone 1 Gm Pre-Mix (Rocephin 1 Gm (3/18/23 03:15)


Doxycycline Hyclate Tablet (Vibramycin T (3/18/23 03:15)





Medications Given in ED





Current Medications








 Medications  Dose


 Ordered  Sig/Jayda


 Route  Start Time


 Stop Time Status Last Admin


Dose Admin


 


 Albuterol/


 Ipratropium  3 ml  ONCE  ONCE


 IH  3/18/23 03:15


 3/18/23 03:16 DC 3/18/23 03:26


3 ML


 


 Ceftriaxone


 Sodium/Dextrose  50 ml @ 


 100 mls/hr  ONCE  ONCE


 IV  3/18/23 03:15


 3/18/23 03:44 DC 3/18/23 03:26


100 MLS/HR


 


 Doxycycline


 Hyclate  100 mg  ONCE  ONCE


 PO  3/18/23 03:15


 3/18/23 03:16 DC 3/18/23 03:26


100 MG


 


 Prednisone  40 mg  ONCE  ONCE


 PO  3/18/23 03:15


 3/18/23 03:16 DC 3/18/23 03:26


40 MG








Vital Signs/I&O











 3/18/23 3/18/23 3/18/23 3/18/23





 03:00 03:05 03:15 03:36


 


Pulse 83   


 


Resp 22   


 


B/P (MAP) 125/54 (77)   


 


Pulse Ox 95  95 96


 


O2 Delivery Nasal Cannula Nasal Cannula Room Air Nasal Cannula


 


O2 Flow Rate 3.00 3.00 3.00 3.00





Capillary Refill :


Progress Note :  


Progress Note


77-year-old male with above history coming in due to productive cough, dyspnea, 

and chest tightness for the past 3 days.  ABCs were intact and vitals were 

stable on 3 L O2 which she has been wearing at home.  Physical exam with 

bilateral lung field wheezing.  He was given a DuoNeb treatments given his 

history of COPD.  Given his increasing productive cough, treated like a COPD 

exacerbation with steroids as well as IV and oral antibiotics.  EKG ordered and 

interpreted by me showing no acute ischemic changes.


Chest x-ray with no lobar infiltrate, poor penetration based on his size makes 

it difficult to interpret.  Labs significant for an elevated white blood cell 

count, normal creatinine, negative troponin.  Clinically symptoms consistent 

with a COPD exacerbation.  We will send a prescription for steroids as well as 

antibiotics.  Given the constant discomfort, with a negative troponin, very 

unlikely to be ACS.  Patient is on Eliquis and not missing any doses, no 

clinical signs of DVT, very unlikely to be a PE.


He was discharged home in stable condition with strict return precautions.





ECG


Initial ECG Impression Date:  Mar 18, 2023


Initial ECG Impression Time:  03:14


Initial ECG Rate:  83


Initial ECG Rhythm:  Normal Sinus


Comment


Narrow QRS, normal axis, no significant ST changes or T wave abnormalities





Diagnostic Imaging





   Diagonstic Imaging:  Xray (chest)


Comments


Single view of the chest ordered and interpreted by me showing no large focal 

infiltrate, no pneumothorax, accounting for different angulation, does appear 

similar to prior





Departure


Impression





   Primary Impression:  


   COPD exacerbation


Disposition:  01 HOME, SELF-CARE


Condition:  Stable





Departure-Patient Inst.


Decision time for Depature:  03:59


Referrals:  


LITA GREEN MD (PCP/Family)


Primary Care Physician


Patient Instructions:  COPD Exacerbation, Adult ED





Add. Discharge Instructions:  


We are concerned you having a COPD exacerbation.  You will be on some steroids 

as well as antibiotics for the next several days.  Please follow-up with your 

regular doctor if you are not feeling some improvement by Monday.


Scripts


Methylprednisolone (Methylprednisolone Dose Pack) 4 Mg Tab.ds.pk


4 MG PO UD for 6 Days, #21 PKG


   PER DOSE PACK INSTRUCTIONS


   Prov: LUCRECIA GALICIA MD         3/18/23 


Doxycycline Hyclate (Doxycycline Hyclate) 100 Mg Tablet


100 MG PO BID for 7 Days, #14 TAB 0 Refills


   Prov: LUCRECIA GALICIA MD         3/18/23


Work/School Note:  Work Release Form   Date Seen in the Emergency Department:  

Mar 18, 2023


   Return to Work:  Mar 19, 2023


   Restrictions:  No Restrictions











LUCRECIA GALICIA MD          Mar 18, 2023 03:25

## 2023-03-18 NOTE — DIAGNOSTIC IMAGING REPORT
HISTORY: Chest pain



TECHNIQUE: Frontal view of the chest.



COMPARISON: 10/04/2021



FINDINGS:



Lung volumes are normal. There is mild airspace opacity in the

left lung base. There is no pleural effusion or pneumothorax. The

cardiac silhouette is stable in size given the patient rotation

to the right.



IMPRESSION:

1. Left basilar airspace opacity, may represent atelectasis or

infiltrate.



Dictated by: 



  Dictated on workstation # [id number]